# Patient Record
Sex: MALE | Race: WHITE | NOT HISPANIC OR LATINO
[De-identification: names, ages, dates, MRNs, and addresses within clinical notes are randomized per-mention and may not be internally consistent; named-entity substitution may affect disease eponyms.]

---

## 2018-10-10 ENCOUNTER — APPOINTMENT (OUTPATIENT)
Dept: FAMILY MEDICINE | Facility: CLINIC | Age: 38
End: 2018-10-10
Payer: OTHER GOVERNMENT

## 2018-10-10 VITALS
DIASTOLIC BLOOD PRESSURE: 76 MMHG | TEMPERATURE: 98.6 F | HEIGHT: 70 IN | OXYGEN SATURATION: 96 % | BODY MASS INDEX: 26.48 KG/M2 | WEIGHT: 185 LBS | SYSTOLIC BLOOD PRESSURE: 110 MMHG | HEART RATE: 75 BPM | RESPIRATION RATE: 16 BRPM

## 2018-10-10 DIAGNOSIS — G44.019 EPISODIC CLUSTER HEADACHE, NOT INTRACTABLE: ICD-10-CM

## 2018-10-10 PROCEDURE — 99213 OFFICE O/P EST LOW 20 MIN: CPT

## 2018-10-10 NOTE — PLAN
[FreeTextEntry1] : Rx as above. Handed out pt information about Cluster headache. Pt will  have previous blood work from the  sent to us.

## 2018-10-10 NOTE — HEALTH RISK ASSESSMENT
[No falls in past year] : Patient reported no falls in the past year [0] : 2) Feeling down, depressed, or hopeless: Not at all (0) [] : No [de-identified] : Social  [[id number]] : 0

## 2018-10-10 NOTE — HISTORY OF PRESENT ILLNESS
[FreeTextEntry1] : Pt c/o HA on right side head  X 1 week  [de-identified] : Pt has had hx of HA for years. Pt has had neuro workup in 2015. CT and MRI were normal. Pt states pain makes him feel "electrocuted" Right side and ear. sensitive to tough. Pt denies visual changes. Has difficulty concentrating. Pt denies photo and phono phobia. Pt denies nausea and vomiting. Pt states he gets this once a year. Lasts about a few weeks. Pt denies any triggers- mabey seasonal allergies. Last blood work was this year.

## 2018-10-10 NOTE — PHYSICAL EXAM
[No Acute Distress] : no acute distress [Well Nourished] : well nourished [Well Developed] : well developed [Well-Appearing] : well-appearing [Normal Sclera/Conjunctiva] : normal sclera/conjunctiva [PERRL] : pupils equal round and reactive to light [EOMI] : extraocular movements intact [Fundoscopic Exam Performed] : fundoscopic ~T exam ~C was performed [Normal Outer Ear/Nose] : the outer ears and nose were normal in appearance [Normal Oropharynx] : the oropharynx was normal [Normal TMs] : both tympanic membranes were normal [Normal Nasal Mucosa] : the nasal mucosa was normal [No JVD] : no jugular venous distention [Supple] : supple [No Lymphadenopathy] : no lymphadenopathy [Thyroid Normal, No Nodules] : the thyroid was normal and there were no nodules present [No Respiratory Distress] : no respiratory distress  [Clear to Auscultation] : lungs were clear to auscultation bilaterally [No Accessory Muscle Use] : no accessory muscle use [Normal Rate] : normal rate  [Regular Rhythm] : with a regular rhythm [Normal S1, S2] : normal S1 and S2 [No Murmur] : no murmur heard [Soft] : abdomen soft [Non Tender] : non-tender [Non-distended] : non-distended [Normal Posterior Cervical Nodes] : no posterior cervical lymphadenopathy [Normal Anterior Cervical Nodes] : no anterior cervical lymphadenopathy [No Joint Swelling] : no joint swelling [Grossly Normal Strength/Tone] : grossly normal strength/tone [No Rash] : no rash [Normal Gait] : normal gait [Coordination Grossly Intact] : coordination grossly intact [No Focal Deficits] : no focal deficits [Deep Tendon Reflexes (DTR)] : deep tendon reflexes were 2+ and symmetric [Normal] : the deep tendon reflexes were normal [Normal Affect] : the affect was normal [Normal Insight/Judgement] : insight and judgment were intact [de-identified] : F

## 2018-10-17 ENCOUNTER — APPOINTMENT (OUTPATIENT)
Dept: FAMILY MEDICINE | Facility: CLINIC | Age: 38
End: 2018-10-17
Payer: OTHER GOVERNMENT

## 2018-10-17 VITALS
HEIGHT: 70 IN | OXYGEN SATURATION: 96 % | DIASTOLIC BLOOD PRESSURE: 70 MMHG | WEIGHT: 207 LBS | HEART RATE: 67 BPM | SYSTOLIC BLOOD PRESSURE: 106 MMHG | TEMPERATURE: 98.1 F | RESPIRATION RATE: 16 BRPM | BODY MASS INDEX: 29.63 KG/M2

## 2018-10-17 DIAGNOSIS — R51 HEADACHE: ICD-10-CM

## 2018-10-17 DIAGNOSIS — H60.501 UNSPECIFIED ACUTE NONINFECTIVE OTITIS EXTERNA, RIGHT EAR: ICD-10-CM

## 2018-10-17 DIAGNOSIS — H65.03 ACUTE SEROUS OTITIS MEDIA, BILATERAL: ICD-10-CM

## 2018-10-17 PROCEDURE — 99214 OFFICE O/P EST MOD 30 MIN: CPT | Mod: 25

## 2018-10-17 PROCEDURE — 92567 TYMPANOMETRY: CPT | Mod: 59

## 2018-10-17 NOTE — PHYSICAL EXAM
[No Acute Distress] : no acute distress [Well Nourished] : well nourished [Well Developed] : well developed [Well-Appearing] : well-appearing [Normal Outer Ear/Nose] : the outer ears and nose were normal in appearance [Normal Oropharynx] : the oropharynx was normal [Normal Sclera/Conjunctiva] : normal sclera/conjunctiva [PERRL] : pupils equal round and reactive to light [EOMI] : extraocular movements intact [Normal Posterior Cervical Nodes] : no posterior cervical lymphadenopathy [Normal Anterior Cervical Nodes] : no anterior cervical lymphadenopathy [No Spinal Tenderness] : no spinal tenderness [Normal Gait] : normal gait [Coordination Grossly Intact] : coordination grossly intact [No Focal Deficits] : no focal deficits [Normal Affect] : the affect was normal [Normal Insight/Judgement] : insight and judgment were intact [de-identified] : Right external canla swollen and red. R tm membrane erythematous and bulging. L tm erythmateous.

## 2018-10-17 NOTE — HISTORY OF PRESENT ILLNESS
[FreeTextEntry1] : Pt c/o Right ear pain X5 days  [de-identified] : Pt states he has an infection on the outer ear on the right side for the last five days. pain is tight and ear is swollen. Pt denies any hearing loss and discharge. Pt feels otherwise in good health. Pt denies any fever, fatigue, cough, sinus congestion. Pt states his headaches seem to have improved. Pt saw neurology who seems to think the etiology of his headaches is a pinched nerve. Pt is currently taking Gabapentin for HA.

## 2018-10-17 NOTE — PLAN
[FreeTextEntry1] : Rx as above. Pt states he has seen Neuro for headaches. Pt states the Imitrex caused him to be drowsy and the Fioricet helped only a little. Pt is currently taking gabapentin for headaches which seems to help.

## 2018-10-17 NOTE — REVIEW OF SYSTEMS
[Earache] : earache [Negative] : Heme/Lymph [Hearing Loss] : no hearing loss [Postnasal Drip] : no postnasal drip [Nasal Discharge] : no nasal discharge [Sore Throat] : no sore throat

## 2018-10-17 NOTE — HEALTH RISK ASSESSMENT
[No falls in past year] : Patient reported no falls in the past year [0] : 2) Feeling down, depressed, or hopeless: Not at all (0) [] : No [de-identified] : Social  [[id number]] : 0

## 2020-02-21 ENCOUNTER — RX RENEWAL (OUTPATIENT)
Age: 40
End: 2020-02-21

## 2022-04-07 ENCOUNTER — NON-APPOINTMENT (OUTPATIENT)
Age: 42
End: 2022-04-07

## 2022-04-07 ENCOUNTER — APPOINTMENT (OUTPATIENT)
Dept: UROLOGY | Facility: CLINIC | Age: 42
End: 2022-04-07
Payer: OTHER GOVERNMENT

## 2022-04-07 VITALS
HEART RATE: 67 BPM | HEIGHT: 70 IN | DIASTOLIC BLOOD PRESSURE: 71 MMHG | SYSTOLIC BLOOD PRESSURE: 116 MMHG | WEIGHT: 220 LBS | BODY MASS INDEX: 31.5 KG/M2 | TEMPERATURE: 93.2 F | OXYGEN SATURATION: 98 %

## 2022-04-07 PROCEDURE — 99203 OFFICE O/P NEW LOW 30 MIN: CPT

## 2022-04-07 RX ORDER — AMOXICILLIN AND CLAVULANATE POTASSIUM 875; 125 MG/1; MG/1
875-125 TABLET, COATED ORAL
Qty: 20 | Refills: 0 | Status: DISCONTINUED | COMMUNITY
Start: 2018-10-17 | End: 2022-04-07

## 2022-04-07 RX ORDER — DIAZEPAM 10 MG/1
10 TABLET ORAL
Qty: 2 | Refills: 0 | Status: ACTIVE | COMMUNITY
Start: 2022-04-07 | End: 1900-01-01

## 2022-04-07 RX ORDER — CIPROFLOXACIN AND DEXAMETHASONE 3; 1 MG/ML; MG/ML
0.3-0.1 SUSPENSION/ DROPS AURICULAR (OTIC) TWICE DAILY
Qty: 1 | Refills: 0 | Status: DISCONTINUED | COMMUNITY
Start: 2018-10-17 | End: 2022-04-07

## 2022-04-07 RX ORDER — SUMATRIPTAN 50 MG/1
50 TABLET, FILM COATED ORAL
Qty: 20 | Refills: 0 | Status: DISCONTINUED | COMMUNITY
Start: 2018-10-10 | End: 2022-04-07

## 2022-04-07 RX ORDER — BUTALBITAL, ASPIRIN, AND CAFFEINE 325; 50; 40 MG/1; MG/1; MG/1
50-325-40 CAPSULE ORAL
Qty: 20 | Refills: 0 | Status: DISCONTINUED | COMMUNITY
Start: 2018-10-10 | End: 2022-04-07

## 2022-04-07 NOTE — ASSESSMENT
[FreeTextEntry1] : We discussed with the patient that vasectomy will be done in the office under the local anesthesia\par We also discussed the possible pain, hematoma and infection after procedure\par WE also inform patient that in the long term 7 % of the patients have a chance for scrotal pain\par I personally inform the patient that after vasectomy the vasectomy revers is a complicated and expensive procedure with successful rates of 75 %\par Patient wants to proceed with vasectomy

## 2022-04-07 NOTE — HISTORY OF PRESENT ILLNESS
[FreeTextEntry1] : 42 year-old patient presented today to discuss bilateral vasectomy\par He is  and has 2 children\par He and his wife got the mutual decision for bilateral vasectomy\par We discussed today the steps of procedure and its possible adverse effects

## 2022-04-08 LAB
BILIRUB UR QL STRIP: NEGATIVE
CLARITY UR: CLEAR
COLLECTION METHOD: NORMAL
GLUCOSE UR-MCNC: NEGATIVE
HCG UR QL: 0.2 EU/DL
HGB UR QL STRIP.AUTO: NORMAL
KETONES UR-MCNC: NEGATIVE
LEUKOCYTE ESTERASE UR QL STRIP: NEGATIVE
NITRITE UR QL STRIP: NEGATIVE
PH UR STRIP: 6
PROT UR STRIP-MCNC: NEGATIVE
SP GR UR STRIP: 1

## 2022-05-11 ENCOUNTER — APPOINTMENT (OUTPATIENT)
Dept: UROLOGY | Facility: CLINIC | Age: 42
End: 2022-05-11
Payer: OTHER GOVERNMENT

## 2022-05-11 VITALS
TEMPERATURE: 97.3 F | WEIGHT: 220 LBS | HEART RATE: 83 BPM | SYSTOLIC BLOOD PRESSURE: 110 MMHG | DIASTOLIC BLOOD PRESSURE: 68 MMHG | BODY MASS INDEX: 31.5 KG/M2 | HEIGHT: 70 IN

## 2022-05-11 DIAGNOSIS — Z98.52 VASECTOMY STATUS: ICD-10-CM

## 2022-05-11 PROCEDURE — 55250 REMOVAL OF SPERM DUCT(S): CPT

## 2022-05-16 ENCOUNTER — NON-APPOINTMENT (OUTPATIENT)
Age: 42
End: 2022-05-16

## 2022-05-18 ENCOUNTER — APPOINTMENT (OUTPATIENT)
Dept: UROLOGY | Facility: CLINIC | Age: 42
End: 2022-05-18
Payer: OTHER GOVERNMENT

## 2022-05-18 VITALS
SYSTOLIC BLOOD PRESSURE: 121 MMHG | HEART RATE: 81 BPM | WEIGHT: 220 LBS | BODY MASS INDEX: 31.5 KG/M2 | TEMPERATURE: 97.3 F | HEIGHT: 70 IN | DIASTOLIC BLOOD PRESSURE: 87 MMHG

## 2022-05-18 DIAGNOSIS — Z98.52 VASECTOMY STATUS: ICD-10-CM

## 2022-05-18 LAB — CORE LAB BIOPSY: NORMAL

## 2022-05-18 PROCEDURE — 99024 POSTOP FOLLOW-UP VISIT: CPT

## 2022-05-18 NOTE — HISTORY OF PRESENT ILLNESS
[FreeTextEntry1] : 43yo M s/p vasectomy last week presents for post vas checkup\par He reports the incision site is healing. He complains of mild right sided dull pain with walking and getting up from chair. He takes advil and tylenol for pain control. There is minimal bruising around right incision site.

## 2022-05-18 NOTE — PHYSICAL EXAM
[General Appearance - Well Developed] : well developed [General Appearance - Well Nourished] : well nourished [Normal Appearance] : normal appearance [Well Groomed] : well groomed [General Appearance - In No Acute Distress] : no acute distress [Abdomen Soft] : soft [Urethral Meatus] : meatus normal [Penis Abnormality] : normal uncircumcised penis [Testes Tenderness] : no tenderness of the testes [] : no respiratory distress [Oriented To Time, Place, And Person] : oriented to person, place, and time [Normal Station and Gait] : the gait and station were normal for the patient's age [No Focal Deficits] : no focal deficits [FreeTextEntry1] : small ecchymosis around right incision. tenderness of spermatic cord. Both incision sites healing well.

## 2023-01-10 ENCOUNTER — TRANSCRIPTION ENCOUNTER (OUTPATIENT)
Age: 43
End: 2023-01-10

## 2023-02-08 ENCOUNTER — APPOINTMENT (OUTPATIENT)
Dept: PSYCHIATRY | Facility: CLINIC | Age: 43
End: 2023-02-08
Payer: OTHER GOVERNMENT

## 2023-02-08 PROCEDURE — 90791 PSYCH DIAGNOSTIC EVALUATION: CPT

## 2023-02-09 NOTE — PHYSICAL EXAM
[Average] : average [Cooperative] : cooperative [Depressed] : depressed [Anxious] : anxious [Full] : full [Clear] : clear [Linear/Goal Directed] : linear/goal directed [WNL] : within normal limits [de-identified] : Mr. Rosenberg was tearful, at times, during today's intake.

## 2023-02-09 NOTE — FAMILY HISTORY
[FreeTextEntry1] : Mr. Rosenberg denied any known family history of mental health diagnoses, concerns, or treatment. In addition, Mr. Rosenberg denied any known family history of alcohol abuse or drug use. Mr. Rosenberg denied any family history of suicide attempts, completed suicide, or psychiatric hospitalizations. Further, Mr. Rosenberg denied any family history of psychosis or manic symptoms.
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
## 2023-02-09 NOTE — PLAN
[Admit to Program     (Add Program Admission information to a new column in the Admit/Discharge Flowsheet)] : Admit to program [Every ___ week(s)] : Psychotherapy: Every [unfilled] week(s) [Individual Therapy] : Individual Therapy [FreeTextEntry4] : Review with treatment team. Mr. Rosenberg will benefit from weekly individual therapy to address client's current symptoms of depression as well as client's identified trauma-related symptoms. In addition, Mr. Rosenberg will benefit from individual therapy to provide client with additional support around his upcoming transition out of the [employer].
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
## 2023-02-09 NOTE — REASON FOR VISIT
[Number can be texted] : number can be texted [OK  to leave message] : OK  to leave message [Other:___] : [unfilled] [Non-[facility] Health Provider/Facility] : Non-[facility] Health Provider/Facility [Patient] : Patient [FreeTextEntry5] : English [FreeTextEntry6] : [facility] [FreeTextEntry1] : "I don't feel right"

## 2023-02-15 ENCOUNTER — APPOINTMENT (OUTPATIENT)
Dept: PSYCHIATRY | Facility: CLINIC | Age: 43
End: 2023-02-15
Payer: OTHER GOVERNMENT

## 2023-02-15 PROCEDURE — 90837 PSYTX W PT 60 MINUTES: CPT

## 2023-02-15 NOTE — PLAN
[Cognitive and/or Behavior Therapy] : Cognitive and/or Behavior Therapy  [Psychoeducation] : Psychoeducation  [Skills training (all types)] : Skills training (all types)  [Supportive Therapy] : Supportive Therapy [Recommended Frequency of Visits: ____] : Recommended frequency of visits: [unfilled] [Return in ____ week(s)] : Return in [unfilled] week(s) [de-identified] : Mr. Rosenberg arrived on time for today's appointment and was oriented to person, place, and time. Mr. Rosenberg and this provider briefly reflected on and discussed client's reactions to his initial intake as well as his reported improved mood since his initial appointment. Mr. Rosenberg expressed concerns around his pattern of being unproductive during the days and the impact of his perception of himself as wasting time on his overall mood. Mr. Rosenberg and this provider worked to better understand client's current routine and sense of being unproductive. In addition, Mr. Rosenberg and this provide identified and discussed client's drive to be efficient and forward thinking. Mr. Rosenberg's experiences were normalized by this provider. Mr. Rosenberg was also provided with psychoeducation around rest, idle time, and self-care and the impact of these activities and behaviors on efficiency and productivity. Further, Mr. Rosenberg and this provider identified and discussed client's pattern of negative self-talk when he feels unproductive and the impact of these self-statements on his overall mood. Mr. Rosenberg and this provider also began to identify and discuss client's avoidance of idle time. Mr. Rosenberg and this provider continued to explore and discuss client's history of trauma as well as his reactions to these experiences and ongoing pattern of emotional avoidance. Mr. Rosenberg was provided with psychoeducation around trauma and trauma reactions. In addition, Mr. Rosenberg and this provider began to explore and discuss client's relationship to emotions as well as his concerns around having time to access his emotions and past experiences.  [FreeTextEntry1] : Continue weekly individual therapy to address symptoms of depression and trauma-related symptoms using a CBT-informed approach.\par \par Problem: depression, low mood, negative beliefs about self, feelings of guilt, worthlessness\par Treatment: CBT, psychoeducation, behavioral activation\par \par Problem: trauma-related symptoms, avoidance of emotions, negative beliefs about self and others, hyperarousal\par Treatment: psychoeducation, exposure therapy, CBT/CPT\par \par Problem: anxiety, rumination, worry, difficulties relaxing, irritability\par Treatment: grounding exercises, CBT, psychoeducation\par

## 2023-02-15 NOTE — PHYSICAL EXAM
[Average] : average [Cooperative] : cooperative [Euthymic] : euthymic [Anxious] : anxious [Full] : full [Clear] : clear [Linear/Goal Directed] : linear/goal directed [WNL] : within normal limits

## 2023-02-22 ENCOUNTER — APPOINTMENT (OUTPATIENT)
Dept: PSYCHIATRY | Facility: CLINIC | Age: 43
End: 2023-02-22
Payer: OTHER GOVERNMENT

## 2023-02-22 PROCEDURE — 90837 PSYTX W PT 60 MINUTES: CPT

## 2023-02-22 NOTE — RISK ASSESSMENT
----- Message from Yevgeniy Lafleur MD sent at 7/10/2019  5:18 PM EDT -----  a1c is good at 6.0 ( 6.7 previously). Recheck a1c in 6 months. Psa is lower 4.7 to 4.3. Recheck psa in 6 months also. [No, patient denies ideation or behavior] : No, patient denies ideation or behavior [Low acute suicide risk] : Low acute suicide risk [No] : No [Not clinically indicated] : Safety Plan completed/updated (for individuals at risk): Not clinically indicated

## 2023-02-22 NOTE — PLAN
[Cognitive and/or Behavior Therapy] : Cognitive and/or Behavior Therapy  [Psychoeducation] : Psychoeducation  [Skills training (all types)] : Skills training (all types)  [Supportive Therapy] : Supportive Therapy [Recommended Frequency of Visits: ____] : Recommended frequency of visits: [unfilled] [Return in ____ week(s)] : Return in [unfilled] week(s) [de-identified] : Mr. Rosenberg arrived on time for today's appointment and was oriented to person, place, and time. Mr. Rosenberg described having an "interesting" week since his last appointment with this provider. Specifically, Mr. 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 Mr. Rosenberg and this provider further explored and discussed client's reactions to these events and client's experiences were normalized and validated by this provider. In addition, Mr. Rosenberg and this provider continued to reflect on and discuss client's relationship to emotions and the pressure he places on himself to control his emotions. Mr. Rosenberg shared and discussed his past experience of disconnecting emotions from specific events, particularly those client considers to be traumatic. Mr. Rosenberg was provided with psychoeducation around the fight-or-flight response, trauma reactions, and emotional avoidance. Mr. Rosenberg and this provider also began to explore and discuss the ways in which client's upbringing and  service reinforced client's patterns of emotional avoidance. Further, Mr. Rosenberg and this provider began to identify and reflect on client's fear of connecting to his emotions as client worries he will lose situational awareness and/or control.  [FreeTextEntry1] : Continue weekly individual therapy to address symptoms of depression and trauma-related symptoms using a CBT-informed approach.\par \par Problem: depression, low mood, negative beliefs about self, feelings of guilt, worthlessness\par Treatment: CBT, psychoeducation, behavioral activation\par \par Problem: trauma-related symptoms, avoidance of emotions, negative beliefs about self and others, hyperarousal\par Treatment: psychoeducation, exposure therapy, CBT/CPT\par \par Problem: anxiety, rumination, worry, difficulties relaxing, irritability\par Treatment: grounding exercises, CBT, psychoeducation\par

## 2023-02-28 ENCOUNTER — APPOINTMENT (OUTPATIENT)
Dept: PSYCHIATRY | Facility: CLINIC | Age: 43
End: 2023-02-28

## 2023-02-28 ENCOUNTER — NON-APPOINTMENT (OUTPATIENT)
Age: 43
End: 2023-02-28

## 2023-03-09 ENCOUNTER — APPOINTMENT (OUTPATIENT)
Dept: PSYCHIATRY | Facility: CLINIC | Age: 43
End: 2023-03-09
Payer: OTHER GOVERNMENT

## 2023-03-09 PROCEDURE — 90837 PSYTX W PT 60 MINUTES: CPT | Mod: 95

## 2023-03-09 NOTE — REASON FOR VISIT
[Patient preference] : as per patient preference [Starting, patient seen in-person within last 6 months] : Telehealth services are being started as patient has seen in-person within last 6 months. [Telehealth (audio & video) - Individual/Group] : This visit was provided via telehealth using real-time 2-way audio visual technology. [Medical Office: ([facility])___] : The provider was located at the medical office in [unfilled]. [Home] : The patient, [unfilled], was located at home, [unfilled], at the time of the visit. [Verbal consent obtained from patient/other participant(s)] : Verbal consent for telehealth/telephonic services obtained from patient/other participant(s) [FreeTextEntry4] : 11:30AM [FreeTextEntry5] : 12:30PM [FreeTextEntry2] : 2/22/23 [Patient] : Patient

## 2023-03-09 NOTE — END OF VISIT
[Teletherapy Service Provided] : The services provided in this session were delivered via tele-therapy [Duration of Psychotherapy Visit (minutes spent in synchronous communication): ____] : Duration of Psychotherapy Visit (minutes spent in synchronous communication): [unfilled] [Individual Psychotherapy for 53+ minutes] : Individual Psychotherapy for 53+ minutes

## 2023-03-09 NOTE — PLAN
[Cognitive and/or Behavior Therapy] : Cognitive and/or Behavior Therapy  [Psychoeducation] : Psychoeducation  [Skills training (all types)] : Skills training (all types)  [Supportive Therapy] : Supportive Therapy [Recommended Frequency of Visits: ____] : Recommended frequency of visits: [unfilled] [Return in ____ week(s)] : Return in [unfilled] week(s) [de-identified] : Mr. Rosenberg arrived on time for today's appointment and was oriented to person, place, and time. Mr. Rosenberg shared and discussed his first week of work and the administrative difficulties he has encountered transitioning into his position. Mr. Rosenberg reported frustration and anxiety related to these difficulties and noted that as a result of these difficulties he feels he has tried harder to counter any potential negative perceptions of coworkers. Mr. Rosenberg and this provider worked to further understand client's concerns around his transition into his position and challenged his beliefs that others will view him negatively because of administrative difficulties. Mr. Rosenberg also shared and discussed his discomfort with the seating arrangement on days when he is in the office as a result of client's perceived blind spots from his desk. Mr. Rosenberg's difficulties were normalized by this provider. Mr. Rosenberg and this provider also reviewed and discussed client's history of positioning himself in corners to reduce his overall anxiety and discomfort. In addition to his transition to work, Mr. Rosenberg shared and discussed his surprise and confusion around feelings of sadness that he experienced commuting to the office. Mr. Rosenberg and this provider further explored and discussed the circumstances surrounding his feelings of sadness and client expressed his beliefs that while he is happy for his current job, he does not feel he deserves to be in his current circumstances. Mr. Rosenberg and this provider continued to reflect on client's sadness as well as his report of the recent anniversary of client witnessing the loss of several peers in [facility]. Mr. Rosenberg's experiences and reactions were normalized by this provider. Mr. Rosenberg was also provided with psychoeducation on trauma and trauma reactions. In addition, Mr. Rosenberg and this provider continued to explore and discuss client's pattern of emotional avoidance and the impact of client's avoidance on his current functioning.  [FreeTextEntry1] : Continue weekly individual therapy to address symptoms of depression and trauma-related symptoms using a CBT-informed approach.\par \par Problem: depression, low mood, negative beliefs about self, feelings of guilt, worthlessness\par Treatment: CBT, psychoeducation, behavioral activation\par \par Problem: trauma-related symptoms, avoidance of emotions, negative beliefs about self and others, hyperarousal\par Treatment: psychoeducation, exposure therapy, CBT/CPT\par \par Problem: anxiety, rumination, worry, difficulties relaxing, irritability\par Treatment: grounding exercises, CBT, psychoeducation\par

## 2023-03-16 ENCOUNTER — APPOINTMENT (OUTPATIENT)
Dept: PSYCHIATRY | Facility: CLINIC | Age: 43
End: 2023-03-16
Payer: OTHER GOVERNMENT

## 2023-03-16 PROCEDURE — 90837 PSYTX W PT 60 MINUTES: CPT | Mod: 95

## 2023-03-16 NOTE — PLAN
[Cognitive and/or Behavior Therapy] : Cognitive and/or Behavior Therapy  [Skills training (all types)] : Skills training (all types)  [Psychoeducation] : Psychoeducation  [Supportive Therapy] : Supportive Therapy [Recommended Frequency of Visits: ____] : Recommended frequency of visits: [unfilled] [Return in ____ week(s)] : Return in [unfilled] week(s) [de-identified] : Mr. Rosenberg arrived on time for today's appointment and was oriented to person, place, and time. Mr. Rosenberg shared and discussed his continued frustration and anxiety related to his onboarding/orientation difficulties. In addition, Mr. Rosenberg shared and reflected on his experience of imposter syndrome and his worry about not meeting the expectations of others. Mr. Rosenberg's reactions were normalized by this provider. Mr. Rosenberg and this provider also identified and reflected on client's expectations for himself in the workplace and the impact of client's expectations on his worry and fear. In addition, Mr. Rosenberg and this provider identified and discussed client's beliefs that he is failing at various tasks despite these tasks being out of client's control. Mr. Rosenberg and this provider further explored and discussed client's beliefs about failure, the impact of these beliefs on his confidence, anxiety, and worry, and worked to challenge client's beliefs related to failure. Mr. Rosenberg and this provider worked to identify and discuss client's strengths and areas of confidence in the workplace. Mr. Rosenberg shared and discussed his perception of himself as being inadequate and having few to no strengths. Mr. Rosenberg's difficulties were normalized by this provider. Mr. Rosenberg and this provider began to explore and discuss client's difficulties internalizing his strengths and his use of focusing on his deficits for motivation. Mr. Rosenberg was challenged to begin to identify positive attributes and strengths.  [FreeTextEntry1] : Continue weekly individual therapy to address symptoms of depression and trauma-related symptoms using a CBT-informed approach.\par \par Problem: depression, low mood, negative beliefs about self, feelings of guilt, worthlessness\par Treatment: CBT, psychoeducation, behavioral activation\par \par Problem: trauma-related symptoms, avoidance of emotions, negative beliefs about self and others, hyperarousal\par Treatment: psychoeducation, exposure therapy, CBT/CPT\par \par Problem: anxiety, rumination, worry, difficulties relaxing, irritability\par Treatment: grounding exercises, CBT, psychoeducation\par

## 2023-03-16 NOTE — REASON FOR VISIT
[Patient preference] : as per patient preference [Starting, patient seen in-person within last 6 months] : Telehealth services are being started as patient has seen in-person within last 6 months. [Telehealth (audio & video) - Individual/Group] : This visit was provided via telehealth using real-time 2-way audio visual technology. [Medical Office: ([facility])___] : The provider was located at the medical office in [unfilled]. [Home] : The patient, [unfilled], was located at home, [unfilled], at the time of the visit. [Verbal consent obtained from patient/other participant(s)] : Verbal consent for telehealth/telephonic services obtained from patient/other participant(s) [Patient] : Patient [FreeTextEntry4] : 12:00PM [FreeTextEntry5] : 1:00PM [FreeTextEntry2] : 2/22/23

## 2023-03-23 ENCOUNTER — APPOINTMENT (OUTPATIENT)
Dept: PSYCHIATRY | Facility: CLINIC | Age: 43
End: 2023-03-23
Payer: OTHER GOVERNMENT

## 2023-03-23 PROCEDURE — 90837 PSYTX W PT 60 MINUTES: CPT | Mod: 95

## 2023-03-23 NOTE — PLAN
[Cognitive and/or Behavior Therapy] : Cognitive and/or Behavior Therapy  [Psychoeducation] : Psychoeducation  [Skills training (all types)] : Skills training (all types)  [Supportive Therapy] : Supportive Therapy [Recommended Frequency of Visits: ____] : Recommended frequency of visits: [unfilled] [Return in ____ week(s)] : Return in [unfilled] week(s) [de-identified] : Mr. Rosenberg arrived on time for today's appointment and was oriented to person, place, and time. Mr. 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 Mr. Rosenberg and this provider further explored and discussed client's current stressors and strategies client has been using to cope with these stressors. Mr. Rosenberg shared and discussed his focus on planning next steps for his wife's care as well as his drive to problem-solve his other stressors. Mr. Rosenberg and this provider continued to explore and discuss client's pattern of using planning and problem-solving to both cope with and avoid his emotions. Mr. Rosenberg and this provider focused on client's reaction to his wife's hospitalization and the impact of her health on his overall mood and functioning. In addition, Mr. Rosenberg and this provider continued to reflect on and discuss client's -related traumas and his use of scheduling and planning to avoid reminders and emotions related to these experiences. Mr. Rosenberg was provided with psychoeducation around trauma and trauma-related symptoms. Mr. Rosenberg and this provider also identified and discussed the pros and cons of client's current emotional avoidance. Mr. Rosenberg identified feelings of anger related to several past traumas and client's anger was normalized by this provider. Mr. Rosenberg and this provider also further explored client's anger and connected his anger to the blame he places on others for these events happening. Mr. Rosenberg was provided with psychoeducation around natural vs. manufactured emotions and client and this provider began to reflect on client's experiences of these types of emotions.  [FreeTextEntry1] : Continue weekly individual therapy to address symptoms of depression and trauma-related symptoms using a CBT-informed approach.\par \par Problem: depression, low mood, negative beliefs about self, feelings of guilt, worthlessness\par Treatment: CBT, psychoeducation, behavioral activation\par \par Problem: trauma-related symptoms, avoidance of emotions, negative beliefs about self and others, hyperarousal\par Treatment: psychoeducation, exposure therapy, CBT/CPT\par \par Problem: anxiety, rumination, worry, difficulties relaxing, irritability\par Treatment: grounding exercises, CBT, \par

## 2023-03-23 NOTE — END OF VISIT
[Duration of Psychotherapy Visit (minutes spent in synchronous communication): ____] : Duration of Psychotherapy Visit (minutes spent in synchronous communication): [unfilled] [Teletherapy Service Provided] : The services provided in this session were delivered via tele-therapy [Individual Psychotherapy for 53+ minutes] : Individual Psychotherapy for 53+ minutes

## 2023-03-23 NOTE — REASON FOR VISIT
[Patient preference] : as per patient preference [Starting, patient seen in-person within last 6 months] : Telehealth services are being started as patient has seen in-person within last 6 months. [Telehealth (audio & video) - Individual/Group] : This visit was provided via telehealth using real-time 2-way audio visual technology. [Medical Office: ([facility])___] : The provider was located at the medical office in [unfilled]. [Home] : The patient, [unfilled], was located at home, [unfilled], at the time of the visit. [Verbal consent obtained from patient/other participant(s)] : Verbal consent for telehealth/telephonic services obtained from patient/other participant(s) [Patient] : Patient [FreeTextEntry4] : 12:00PM [FreeTextEntry5] : 1:00PM

## 2023-03-30 ENCOUNTER — APPOINTMENT (OUTPATIENT)
Dept: PSYCHIATRY | Facility: CLINIC | Age: 43
End: 2023-03-30
Payer: OTHER GOVERNMENT

## 2023-03-30 PROCEDURE — 90837 PSYTX W PT 60 MINUTES: CPT | Mod: 95

## 2023-03-30 NOTE — PLAN
[Cognitive and/or Behavior Therapy] : Cognitive and/or Behavior Therapy  [Psychoeducation] : Psychoeducation  [Skills training (all types)] : Skills training (all types)  [Supportive Therapy] : Supportive Therapy [Recommended Frequency of Visits: ____] : Recommended frequency of visits: [unfilled] [Return in ____ week(s)] : Return in [unfilled] week(s) [de-identified] : Mr. Rosenberg arrived on time for today's appointment and was oriented to person, place, and time. Mr. Rosenberg briefly shared and discussed his wife's transition home following 8 days in the hospital and client's recognition of the similarities between her return and client's own return from [facility] in the past. In addition, Mr. Rosenberg shared and reflected on his reactions to learning that his best friend's father  as well as client's efforts to support his friend. Mr. Rosenberg's experiences and reactions were validated and normalized by this provider. Mr. Rosenberg focused today's session on client's reaction to learning of a 's death by suicide and his feelings of fear and sadness related to this news. Mr. Rosenberg and this provider further explored and discussed client's reaction to this news and client's worry that he will, at some point, experience similar difficulties. Mr. Rosenberg identified his own history of trauma and his patterns of isolation and emotional avoidance as contributing to client's worry about his well-being in the future. Mr. Rosenberg's concerns were normalized by this provider. Mr. Rosenberg and this provider continued to explore and discuss the impact of client's history on his current functioning and concerns. In addition, Mr. Rosenberg and this provider continued to identify and discuss the differences between emotional avoidance and emotional processing. Mr. Rosenberg agreed to begin tracking specific memories/events/triggers client experiences throughout the week.  [FreeTextEntry1] : Continue weekly individual therapy to address symptoms of depression and trauma-related symptoms using a CBT-informed approach.\par \par Problem: depression, low mood, negative beliefs about self, feelings of guilt, worthlessness\par Treatment: CBT, psychoeducation, behavioral activation\par \par Problem: trauma-related symptoms, avoidance of emotions, negative beliefs about self and others, hyperarousal\par Treatment: psychoeducation, exposure therapy, CBT/CPT\par \par Problem: anxiety, rumination, worry, difficulties relaxing, irritability\par Treatment: grounding exercises, CBT, psychoeducation\par

## 2023-03-30 NOTE — REASON FOR VISIT
[Patient preference] : as per patient preference [Starting, patient seen in-person within last 6 months] : Telehealth services are being started as patient has seen in-person within last 6 months. [Telehealth (audio & video) - Individual/Group] : This visit was provided via telehealth using real-time 2-way audio visual technology. [Other Location: e.g. Home (Enter Location, City,State)___] : The provider was located at [unfilled]. [Home] : The patient, [unfilled], was located at home, [unfilled], at the time of the visit. [Verbal consent obtained from patient/other participant(s)] : Verbal consent for telehealth/telephonic services obtained from patient/other participant(s) [Patient] : Patient [FreeTextEntry4] : 12:00PM [FreeTextEntry5] : 1:00PM

## 2023-04-06 ENCOUNTER — APPOINTMENT (OUTPATIENT)
Dept: PSYCHIATRY | Facility: CLINIC | Age: 43
End: 2023-04-06
Payer: OTHER GOVERNMENT

## 2023-04-06 PROCEDURE — 90837 PSYTX W PT 60 MINUTES: CPT | Mod: 95

## 2023-04-06 NOTE — REASON FOR VISIT
[Patient preference] : as per patient preference [Starting, patient seen in-person within last 6 months] : Telehealth services are being started as patient has seen in-person within last 6 months. [Telehealth (audio & video) - Individual/Group] : This visit was provided via telehealth using real-time 2-way audio visual technology. [Medical Office: ([facility])___] : The provider was located at the medical office in [unfilled]. [Home] : The patient, [unfilled], was located at home, [unfilled], at the time of the visit. [Verbal consent obtained from patient/other participant(s)] : Verbal consent for telehealth/telephonic services obtained from patient/other participant(s) [Patient] : Patient [FreeTextEntry4] : 12:00PM [FreeTextEntry5] : 1:02PM

## 2023-04-06 NOTE — PHYSICAL EXAM
[Average] : average [Cooperative] : cooperative [Full] : full [Clear] : clear [Linear/Goal Directed] : linear/goal directed [WNL] : within normal limits [FreeTextEntry8] : "stressed"

## 2023-04-06 NOTE — PLAN
[Cognitive and/or Behavior Therapy] : Cognitive and/or Behavior Therapy  [Psychoeducation] : Psychoeducation  [Skills training (all types)] : Skills training (all types)  [Supportive Therapy] : Supportive Therapy [Recommended Frequency of Visits: ____] : Recommended frequency of visits: [unfilled] [Return in ____ week(s)] : Return in [unfilled] week(s) [de-identified] : Mr. Rosenberg arrived on time for today's appointment and was oriented to person, place, and time. Mr. Rosenberg shared and reflected on an experience with his family over the weekend during which client became frustrated and banged his steering wheel. Mr. Rosenberg and this provider further explored and discussed client's frustration and the circumstances surrounding this experience. In addition, Mr. Rosenberg shared and discussed feelings of frustration and agitation at work within the context of other's not taking initiative. Mr. Rosenberg's frustrations were normalized by this provider. Mr. Rosenberg and this provider further explored and discussed client's report of frustration in both circumstances and also identified client's feelings of helplessness and inadequacy. Further, Mr. Rosenberg and this provider continued to reflect on and discuss client's discomfort with and disbelief when receiving praise and the ways in which client's beliefs about himself might also impact client's emotions at work and at home. Mr. Rosenberg and this provider also began to identify and reflect on client's drive to problem-solve situations and emotions and his experience of frustration when he is unable to do so. Mr. Rosenberg and this provider continued to challenge client's perception of himself as having to solve problems and emotions for himself and others.  [FreeTextEntry1] : Continue weekly individual therapy to address symptoms of depression and trauma-related symptoms using a CBT-informed approach.\par \par Problem: depression, low mood, negative beliefs about self, feelings of guilt, worthlessness\par Treatment: CBT, psychoeducation, behavioral activation\par \par Problem: trauma-related symptoms, avoidance of emotions, negative beliefs about self and others, hyperarousal\par Treatment: psychoeducation, exposure therapy, CBT/CPT\par \par Problem: anxiety, rumination, worry, difficulties relaxing, irritability\par Treatment: grounding exercises, CBT, psychoeducation\par

## 2023-04-13 ENCOUNTER — APPOINTMENT (OUTPATIENT)
Dept: PSYCHIATRY | Facility: CLINIC | Age: 43
End: 2023-04-13
Payer: OTHER GOVERNMENT

## 2023-04-13 PROCEDURE — 90837 PSYTX W PT 60 MINUTES: CPT | Mod: 95

## 2023-04-13 NOTE — PLAN
[Cognitive and/or Behavior Therapy] : Cognitive and/or Behavior Therapy  [Psychoeducation] : Psychoeducation  [Skills training (all types)] : Skills training (all types)  [Supportive Therapy] : Supportive Therapy [Recommended Frequency of Visits: ____] : Recommended frequency of visits: [unfilled] [Return in ____ week(s)] : Return in [unfilled] week(s) [de-identified] : Mr. Rosenberg arrived on time for today's appointment and was oriented to person, place, and time. Mr. Rosenberg shared and discussed a concerning experience at work during which client became frustrated and upset and began writing "calm down" on a piece of paper to manage his emotional intensity. Mr. Rosenberg and this provider identified and discussed the circumstances surrounding client's frustration and client's reactions were normalized by this provider. In addition, Mr. Rosenberg and this provider further explored and discussed client's concerns regarding his use of coping skills and his negative associations to writing "calm down" on paper as client perceives this behavior to be abnormal. Mr. Rosenberg's use of effective coping skills was validated by this provider. In addition, Mr. Rosenberg was provided with psychoeducation around coping skills and individual differences in the use of coping skills. Mr. Rosenberg and this provider continued to reflect on and discuss client's beliefs related to his identified coping strategy and worked to identify alternative coping skills that have been effective and/or which client beliefs might be effective in the future. Mr. Rosenberg also expressed concerns around increased irritability at home. Mr. Rosenberg shared and discussed a specific example of an interaction with his wife. This provider reflected back to client his pattern of having his needs left unmet both at home and at work. In addition, Mr. Rosenberg and this provider briefly reflected on and discussed the impact of client's current stressors and his unmet needs on his irritability. At the end of today's appointment, Mr. Rosenberg briefly shared and discussed his reaction to reading his session notes by this provider. Mr. Rosenberg and this provider agreed to follow-up regarding client's reaction to his session notes during client's next appointment.  [FreeTextEntry1] : Continue weekly individual therapy to address symptoms of depression and trauma-related symptoms using a CBT-informed approach.\par \par Problem: depression, low mood, negative beliefs about self, feelings of guilt, worthlessness\par Treatment: CBT, psychoeducation, behavioral activation\par \par Problem: trauma-related symptoms, avoidance of emotions, negative beliefs about self and others, hyperarousal\par Treatment: psychoeducation, exposure therapy, CBT/CPT\par \par Problem: anxiety, rumination, worry, difficulties relaxing, irritability\par Treatment: grounding exercises, CBT, psychoeducation\par

## 2023-04-20 ENCOUNTER — APPOINTMENT (OUTPATIENT)
Dept: PSYCHIATRY | Facility: CLINIC | Age: 43
End: 2023-04-20
Payer: OTHER GOVERNMENT

## 2023-04-20 PROCEDURE — 90837 PSYTX W PT 60 MINUTES: CPT | Mod: 95

## 2023-04-20 NOTE — PLAN
[Cognitive and/or Behavior Therapy] : Cognitive and/or Behavior Therapy  [Psychoeducation] : Psychoeducation  [Supportive Therapy] : Supportive Therapy [Recommended Frequency of Visits: ____] : Recommended frequency of visits: [unfilled] [Return in ____ week(s)] : Return in [unfilled] week(s) [de-identified] : Mr. Rosenberg arrived on time for today's appointment and was oriented to person, place, and time. Mr. Rosenberg and this provider continued to discuss client's reaction to reading progress notes from his appointments with this provider and his reported feelings of sadness. Mr. Rosenberg identified and discussed the ways in which reading his notes have reinforced the reality of his current difficulties. In addition, Mr. Rosenberg expressed his worries about the limits of treating his current symptoms. Mr. Rosenberg's reactions and concerns were normalized by this provider. In addition, Mr. Rosenberg and this provider reviewed and discussed his beliefs around healing from physical injuries as well as his perceptions of healing from mental health difficulties and the similarities and differences between these treatments. Mr. Rosenberg and this provider further explored and discussed client's emotions while reading his notes and client identified shame, embarrassment, fear, and disappointment, in addition to sadness. Mr. Rosenberg also expressed his perception of himself as being abnormal for experiencing symptoms of depression and trauma-related symptoms. Mr. Rosenberg's reactions were normalized by this provider. Mr. Rosenberg was also provided with psychoeducation around the prevalence of depression as well as psychoeducation around reactions to trauma. Finally, Mr. Rosenberg and this provider continued to review and discuss client's concerns around interactions at work and ongoing frustrations with his director's management style. Mr. Rosenberg's frustrations were validated by this provider. Mr. Rosenberg and this provider also began to identify and discuss client's concern/anxiety around ways he is perceived and his cognitions around speech, body language, and decision making during interpersonal interactions.  [FreeTextEntry1] : Continue weekly individual therapy to address symptoms of depression and trauma-related symptoms using a CBT-informed approach.\par \par Problem: depression, low mood, negative beliefs about self, feelings of guilt, worthlessness\par Treatment: CBT, psychoeducation, behavioral activation\par \par Problem: trauma-related symptoms, avoidance of emotions, negative beliefs about self and others, hyperarousal\par Treatment: psychoeducation, exposure therapy, CBT/CPT\par \par Problem: anxiety, rumination, worry, difficulties relaxing, irritability\par Treatment: grounding exercises, CBT, psychoeducation\par

## 2023-04-20 NOTE — REASON FOR VISIT
[Patient preference] : as per patient preference [Starting, patient seen in-person within last 6 months] : Telehealth services are being started as patient has seen in-person within last 6 months. [Telehealth (audio & video) - Individual/Group] : This visit was provided via telehealth using real-time 2-way audio visual technology. [Medical Office: ([facility])___] : The provider was located at the medical office in [unfilled]. [Home] : The patient, [unfilled], was located at home, [unfilled], at the time of the visit. [Verbal consent obtained from patient/other participant(s)] : Verbal consent for telehealth/telephonic services obtained from patient/other participant(s) [Patient] : Patient [FreeTextEntry4] : 12:01PM [FreeTextEntry5] : 1:02PM

## 2023-04-27 ENCOUNTER — APPOINTMENT (OUTPATIENT)
Dept: PSYCHIATRY | Facility: CLINIC | Age: 43
End: 2023-04-27
Payer: OTHER GOVERNMENT

## 2023-04-27 PROCEDURE — 90837 PSYTX W PT 60 MINUTES: CPT | Mod: 95

## 2023-04-27 NOTE — PLAN
[Cognitive and/or Behavior Therapy] : Cognitive and/or Behavior Therapy  [Psychoeducation] : Psychoeducation  [Skills training (all types)] : Skills training (all types)  [Supportive Therapy] : Supportive Therapy [Recommended Frequency of Visits: ____] : Recommended frequency of visits: [unfilled] [de-identified] : Mr. Rosenberg arrived 7 minutes late for today's appointment and was oriented to person, place, and time. Mr. Rosenberg shared and reflected on his reaction to a conversation this week during which client was asked to identify two words to describe himself. Mr. Rosenberg expressed his perception of himself as being a jerk after initially identifying himself as being conscientious and modest. Mr. Rosenberg's traits were validated by this provider. Mr. Rosenberg and this provider further explored and discussed client's perception of himself as being a jerk for labeling himself as modest after the subsequent conversation he had with his mentor. Mr. Rosenberg and this provider continued to reflect on and discuss client's perceptions of himself and his worries about how others might perceive him. In addition, Mr. Rosenberg was challenged to identify and reflect on the ways in which client's behaviors and motivations reflect modesty. Mr. Rosenberg also shared and reflected on his reaction to a friend describing client as being a hero and his discomfort with this label and with receiving compliments. Mr. Rosenberg and this provider continued to reflect on client's reaction to positive feedback and acknowledgements from others as well as his pattern of qualifying/discounting recognition from others. Mr. Rosenberg was challenged to accept compliments from others without explicitly or implicitly discounting the feedback.  [Return in ____ week(s)] : Return in [unfilled] week(s) [FreeTextEntry1] : Continue weekly individual therapy to address symptoms of depression and trauma-related symptoms using a CBT-informed approach. Next appointment scheduled for 5/11/23 at 12PM due to scheduled provider absence on 5/4/23. \par \par Problem: depression, low mood, negative beliefs about self, feelings of guilt, worthlessness\par Treatment: CBT, psychoeducation, behavioral activation\par \par Problem: trauma-related symptoms, avoidance of emotions, negative beliefs about self and others, hyperarousal\par Treatment: psychoeducation, exposure therapy, CBT/CPT\par \par Problem: anxiety, rumination, worry, difficulties relaxing, irritability\par Treatment: grounding exercises, CBT, psychoeducation\par

## 2023-04-27 NOTE — REASON FOR VISIT
[Patient preference] : as per patient preference [Starting, patient seen in-person within last 6 months] : Telehealth services are being started as patient has seen in-person within last 6 months. [Telehealth (audio & video) - Individual/Group] : This visit was provided via telehealth using real-time 2-way audio visual technology. [Medical Office: ([facility])___] : The provider was located at the medical office in [unfilled]. [Home] : The patient, [unfilled], was located at home, [unfilled], at the time of the visit. [Verbal consent obtained from patient/other participant(s)] : Verbal consent for telehealth/telephonic services obtained from patient/other participant(s) [Patient] : Patient [FreeTextEntry4] : 12:07PM [FreeTextEntry5] : 1:01PM

## 2023-05-11 ENCOUNTER — APPOINTMENT (OUTPATIENT)
Dept: PSYCHIATRY | Facility: CLINIC | Age: 43
End: 2023-05-11
Payer: OTHER GOVERNMENT

## 2023-05-11 PROCEDURE — 90837 PSYTX W PT 60 MINUTES: CPT | Mod: 95

## 2023-05-11 NOTE — PLAN
[Cognitive and/or Behavior Therapy] : Cognitive and/or Behavior Therapy  [Psychoeducation] : Psychoeducation  [Skills training (all types)] : Skills training (all types)  [Supportive Therapy] : Supportive Therapy [Recommended Frequency of Visits: ____] : Recommended frequency of visits: [unfilled] [Return in ____ week(s)] : Return in [unfilled] week(s) [de-identified] : Mr. Rosenberg arrived on time for today's appointment and was oriented to person, place, and time. Mr. Rosenberg and this provider briefly reviewed and discussed client's upcoming schedule change and identified alternative appointment times. Mr. Rosenberg shared and discussed two recent experiences including a follow-up conversation with his mentor to discuss client's reaction to identifying himself as modest. In addition, Mr. Rosenberg reflected on and discussed his reaction to a presentation on imposter syndrome and his recognition of his own behaviors during this presentation. Mr. Rosenberg and this provider focused on client's reaction to a presentation on imposter syndrome. In addition, Mr. Rosenberg and this provider explored and discussed client's experience of tearfulness and worry during this presentation because of client being at work and connecting to the material presented. Mr. Rosenberg's reactions were normalized by this provider. Mr. Rosenberg and this provider further explored and discussed client's connection to the material presented as well as client's recognition of himself as valuing others and their feelings/opinions over himself and his own experiences. Mr. Rosenberg and this provider also reviewed and discussed client's report of becoming angry with himself and engaging in critical self-talk while emoting at work. Mr. Rosenberg's discomfort expressing emotion at work was normalized by this provider. In addition, he and this provider worked to identify alternative coping skills and self-soothing strategies instead of self-criticism. Finally, Mr. Rosenberg and this provider briefly identified and discussed client's intense discomfort with his upcoming [employer] party in early June. Mr. Rosenberg and this provider agreed to follow-up regarding client's reaction to this event during his next appointment.  [FreeTextEntry1] : Continue weekly individual therapy to address symptoms of depression and trauma-related symptoms using a CBT-informed approach. \par \par Problem: depression, low mood, negative beliefs about self, feelings of guilt, worthlessness\par Treatment: CBT, psychoeducation, behavioral activation\par \par Problem: trauma-related symptoms, avoidance of emotions, negative beliefs about self and others, hyperarousal\par Treatment: psychoeducation, exposure therapy, CBT/CPT\par \par Problem: anxiety, rumination, worry, difficulties relaxing, irritability\par Treatment: grounding exercises, CBT, psychoeducation\par

## 2023-05-11 NOTE — REASON FOR VISIT
[Patient preference] : as per patient preference [Continuing, patient seen in-person within last 12 months] : Telehealth services are continuing as patient has been seen in-person within last 12 months. [Telehealth (audio & video) - Individual/Group] : This visit was provided via telehealth using real-time 2-way audio visual technology. [Medical Office: ([facility])___] : The provider was located at the medical office in [unfilled]. [Home] : The patient, [unfilled], was located at home, [unfilled], at the time of the visit. [Verbal consent obtained from patient/other participant(s)] : Verbal consent for telehealth/telephonic services obtained from patient/other participant(s) [Patient] : Patient [FreeTextEntry4] : 11:59AM [FreeTextEntry5] : 1:01PM

## 2023-05-18 ENCOUNTER — APPOINTMENT (OUTPATIENT)
Dept: PSYCHIATRY | Facility: CLINIC | Age: 43
End: 2023-05-18
Payer: OTHER GOVERNMENT

## 2023-05-18 PROCEDURE — 90837 PSYTX W PT 60 MINUTES: CPT | Mod: 95

## 2023-05-18 NOTE — REASON FOR VISIT
[Patient preference] : as per patient preference [Continuing, patient seen in-person within last 12 months] : Telehealth services are continuing as patient has been seen in-person within last 12 months. [Telehealth (audio & video) - Individual/Group] : This visit was provided via telehealth using real-time 2-way audio visual technology. [Medical Office: ([facility])___] : The provider was located at the medical office in [unfilled]. [Home] : The patient, [unfilled], was located at home, [unfilled], at the time of the visit. [Verbal consent obtained from patient/other participant(s)] : Verbal consent for telehealth/telephonic services obtained from patient/other participant(s) [Patient] : Patient [FreeTextEntry4] : 12:00PM [FreeTextEntry5] : 1:01PM

## 2023-05-18 NOTE — PLAN
[Cognitive and/or Behavior Therapy] : Cognitive and/or Behavior Therapy  [Psychoeducation] : Psychoeducation  [Skills training (all types)] : Skills training (all types)  [Supportive Therapy] : Supportive Therapy [Recommended Frequency of Visits: ____] : Recommended frequency of visits: [unfilled] [Return in ____ week(s)] : Return in [unfilled] week(s) [de-identified] : Mr. Rosenberg arrived on time for today's appointment and was oriented to person, place, and time. Mr. Rosenberg and this provider completed the CHRIS-7 and PHQ-9. Mr. Rosenberg shared and discussed his recent experience in a work meeting in which client observed a peer drawing on a note pad and client's reaction to this behavior. In addition, Mr. Rosenberg shared and reflected on his discomfort and worry after his director disclosed to others in a meeting that client had participated in a particular discussion. Mr. Rosenberg and this provider further explored and discussed client's reactions to events in this meeting as well as his continued concerns about his relationships with his peers. In addition, Mr. Rosenberg and this provider further reflected on and discussed client's relationship with his director and the pros and cons of client establishing boundaries with his director. Further, Mr. Rosenberg and this provider briefly reflected on and discussed the benefits of client building relationships with his peer group outside of the work setting. Mr. Rosenberg and this provider also began to reflect on and discuss client's upcoming skilled nursing party and his discomfort with and distress related to this event. 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 In addition, Mr. Rosenberg expressed concerns around choosing an individual to speak at the event and ensuring that others are satisfied with the event. Mr. Rosenberg was challenged to identify his goals for this event and needs during the event given his disinterest in the event and his desire to leave his  career in the past. In addition, Mr. Rosenberg began to reflect on and discuss strategies client might use to advocate for his needs and cope with different reactions during the event. Finally, Mr. Rosenberg was challenged to consider his motivations for leaving his  career in the past. Mr. Rosenberg and this provider continued to identify and reflect on client's pattern of avoidance.  [FreeTextEntry1] : Continue weekly individual therapy to address symptoms of depression and trauma-related symptoms using a CBT-informed approach. \par \par Problem: depression, low mood, negative beliefs about self, feelings of guilt, worthlessness\par Treatment: CBT, psychoeducation, behavioral activation\par \par Problem: trauma-related symptoms, avoidance of emotions, negative beliefs about self and others, hyperarousal\par Treatment: psychoeducation, exposure therapy, CBT/CPT\par \par Problem: anxiety, rumination, worry, difficulties relaxing, irritability\par Treatment: grounding exercises, CBT, psychoeducation\par

## 2023-05-25 ENCOUNTER — APPOINTMENT (OUTPATIENT)
Dept: PSYCHIATRY | Facility: CLINIC | Age: 43
End: 2023-05-25
Payer: OTHER GOVERNMENT

## 2023-05-25 PROCEDURE — 90837 PSYTX W PT 60 MINUTES: CPT | Mod: 95

## 2023-05-25 NOTE — REASON FOR VISIT
[Patient preference] : as per patient preference [Continuing, patient seen in-person within last 12 months] : Telehealth services are continuing as patient has been seen in-person within last 12 months. [Telehealth (audio & video) - Individual/Group] : This visit was provided via telehealth using real-time 2-way audio visual technology. [Medical Office: ([facility])___] : The provider was located at the medical office in [unfilled]. [Home] : The patient, [unfilled], was located at home, [unfilled], at the time of the visit. [Verbal consent obtained from patient/other participant(s)] : Verbal consent for telehealth/telephonic services obtained from patient/other participant(s) [Patient] : Patient [FreeTextEntry4] : 12:00PM [FreeTextEntry5] : 1:00PM

## 2023-05-25 NOTE — PLAN
[Cognitive and/or Behavior Therapy] : Cognitive and/or Behavior Therapy  [Psychoeducation] : Psychoeducation  [Skills training (all types)] : Skills training (all types)  [Supportive Therapy] : Supportive Therapy [Recommended Frequency of Visits: ____] : Recommended frequency of visits: [unfilled] [Return in ____ week(s)] : Return in [unfilled] week(s) [de-identified] : Mr. Rosenberg arrived on time for today's appointment and was oriented to person, place, and time. Mr. Rosenberg shared and discussed ongoing planning for his upcoming [facility] party and recent decisions client has made with regard to the events of the day. Mr. Rosenberg and this provider briefly continued to explore and discuss client's discomfort with having a [facility] party and client's reactions were validated by this provider. Mr. Rosenberg also shared and discussed his decision to apologize to a coworker this morning after making a joke last night. Mr. Rosenberg and this provider further reviewed and discussed client's interaction with his coworker and his motivation for apologizing. In addition, Mr. Rosenberg and this provider challenged client's perception of himself as needing to apologize for his statement. Mr. Rosenberg and this provider briefly discussed client's experience of increased physical pain and client was provided with psychoeducation around chronic pain and pacing activities. Mr. Rosenberg also shared and discussed his perception of his increased pain as impacting his mood and resulting in periods of sadness since his last appointment. Mr. Rosenberg and this provider further explored and discussed client's report of episodes of sadness and client's connection between increased pain and worsening mood was normalized by this provider. Mr. Rosenberg and this provider worked to identify additional factors that have contributed to client's sadness including client reading an article about the loss of a former [employer], client perceiving himself as not being worth others wanting to connect with, and client missing his children. Mr. Rosenberg was encouraged to track his environment and cognitions during periods of sadness.  [FreeTextEntry1] : Continue weekly individual therapy to address symptoms of depression and trauma-related symptoms using a CBT-informed approach. \par \par Problem: depression, low mood, negative beliefs about self, feelings of guilt, worthlessness\par Treatment: CBT, psychoeducation, behavioral activation\par \par Problem: trauma-related symptoms, avoidance of emotions, negative beliefs about self and others, hyperarousal\par Treatment: psychoeducation, exposure therapy, CBT/CPT\par \par Problem: anxiety, rumination, worry, difficulties relaxing, irritability\par Treatment: grounding exercises, CBT, psychoeducation\par

## 2023-06-01 ENCOUNTER — APPOINTMENT (OUTPATIENT)
Dept: PSYCHIATRY | Facility: CLINIC | Age: 43
End: 2023-06-01
Payer: OTHER GOVERNMENT

## 2023-06-01 PROCEDURE — 90837 PSYTX W PT 60 MINUTES: CPT | Mod: 95

## 2023-06-01 NOTE — REASON FOR VISIT
[Patient preference] : as per patient preference [Continuing, patient seen in-person within last 12 months] : Telehealth services are continuing as patient has been seen in-person within last 12 months. [Telehealth (audio & video) - Individual/Group] : This visit was provided via telehealth using real-time 2-way audio visual technology. [Medical Office: ([facility])___] : The provider was located at the medical office in [unfilled]. [Home] : The patient, [unfilled], was located at home, [unfilled], at the time of the visit. [Verbal consent obtained from patient/other participant(s)] : Verbal consent for telehealth/telephonic services obtained from patient/other participant(s) [Patient] : Patient [FreeTextEntry4] : 12:00PM [FreeTextEntry5] : 1:02PM

## 2023-06-01 NOTE — PHYSICAL EXAM
[Average] : average [Euthymic] : euthymic [Cooperative] : cooperative [Full] : full [Clear] : clear [Linear/Goal Directed] : linear/goal directed [WNL] : within normal limits

## 2023-06-01 NOTE — PLAN
[Cognitive and/or Behavior Therapy] : Cognitive and/or Behavior Therapy  [Skills training (all types)] : Skills training (all types)  [Supportive Therapy] : Supportive Therapy [Recommended Frequency of Visits: ____] : Recommended frequency of visits: [unfilled] [Return in ____ week(s)] : Return in [unfilled] week(s) [de-identified] : Mr. Rosenberg arrived on time for today's appointment and was oriented to person, place, and time. Mr. Rosenberg briefly shared and discussed his experience this morning at his son's pre-k graduation. In addition, Mr. Rosenberg briefly reflected on and discussed his sadness and worry after watching a video on the train earlier this weak and wanting to avoid becoming a  statistic. Mr. Rosenberg's reactions were validated by this provider. Mr. Rosenberg and this provider also briefly reflected on and discussed client's reaction to receiving positive feedback at work and his acknowledgement of his work-related performance. Mr. Rosenberg's feedback was celebrated by this provider. Mr. Rosenberg and this provider continued to explore and discuss client's upcoming shelter party and his continued anxiety related to this party. Mr. Rosenberg shared and discussed the sequence of events for the party as well as his areas of concern throughout the day, including client's own remarks and the flag folding ceremony. Mr. Rosenberg's concerns were validated by this provider. In addition, Mr. Rosenberg and this provider identified client's planned use of coping skills during these events and client's proactive use of coping skills was validated by this provider. Mr. Rosenberg and this provider continued to reflect on and discuss client's feelings related to his shelter party as well as client's reactions/feelings to his career in the [employer]. Mr. Rosenberg shared and discussed areas of accomplishment, happiness, regret, disappointment, and frustration. This provider reflected back to client his pattern of labeling and intellectualizing his emotions and client was provided with psychoeducation around differences between labeling and experiencing emotion. Mr. Rosenberg was challenged to identify emotions experienced during his shelter party after the completion of his party. Mr. Rosenberg and this provider also briefly identified and discussed techniques to help client remain grounded throughout his party.   [FreeTextEntry1] : Continue weekly individual therapy to address symptoms of depression and trauma-related symptoms using a CBT-informed approach. \par \par Problem: depression, low mood, negative beliefs about self, feelings of guilt, worthlessness\par Treatment: CBT, psychoeducation, behavioral activation\par \par Problem: trauma-related symptoms, avoidance of emotions, negative beliefs about self and others, hyperarousal\par Treatment: psychoeducation, exposure therapy, CBT/CPT\par \par Problem: anxiety, rumination, worry, difficulties relaxing, irritability\par Treatment: grounding exercises, CBT, psychoeducation\par

## 2023-06-08 ENCOUNTER — APPOINTMENT (OUTPATIENT)
Dept: PSYCHIATRY | Facility: CLINIC | Age: 43
End: 2023-06-08
Payer: OTHER GOVERNMENT

## 2023-06-08 PROCEDURE — 90837 PSYTX W PT 60 MINUTES: CPT | Mod: 95

## 2023-06-08 NOTE — REASON FOR VISIT
[Patient preference] : as per patient preference [Continuing, patient seen in-person within last 12 months] : Telehealth services are continuing as patient has been seen in-person within last 12 months. [Telehealth (audio & video) - Individual/Group] : This visit was provided via telehealth using real-time 2-way audio visual technology. [Medical Office: ([facility])___] : The provider was located at the medical office in [unfilled]. [Home] : The patient, [unfilled], was located at home, [unfilled], at the time of the visit. [Verbal consent obtained from patient/other participant(s)] : Verbal consent for telehealth/telephonic services obtained from patient/other participant(s) [Patient] : Patient [FreeTextEntry4] : 12:00PM [FreeTextEntry5] : 1:01PM

## 2023-06-08 NOTE — PLAN
[Cognitive and/or Behavior Therapy] : Cognitive and/or Behavior Therapy  [Psychoeducation] : Psychoeducation  [Skills training (all types)] : Skills training (all types)  [Supportive Therapy] : Supportive Therapy [de-identified] : Mr. Rosenberg arrived on time for today's appointment and was oriented to person, place, and time. Mr. Rosenberg shared and discussed his recent [employer] party, client's relief around the event being completed, and his reactions to the event itself. Mr. Rosenberg and this provider reviewed and discussed specific portions of the event and strategies that were helpful to client in managing his reactions including having his son near and focusing on the reactions of others. Mr. Rosenberg's use of coping skills was validated by this provider. Mr. 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 Mr. Rosenberg's discomfort in being the center of attention was validated by this provider. Mr. 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 Mr. Rosenberg and this provider also continued to explore and discuss his actions and accomplishments as being routine as client believes others in his position would have engaged in similar manners. This provider reflected back to client his pattern of crediting other individuals for their decisions/choices while minimizing his own. Mr. Rosenberg and this provider also identified and discussed client's perception of failure as well as times when client has perceived himself to have failed and worked to challenge client's perception of failing. Finally, Mr. Rosenberg reported increased physical pain following his [employer] party. Mr. Rosenberg and this provider reviewed and discussed client's engagement in physical activity and continued to discuss the pros and cons of client pacing physical activity.  [Recommended Frequency of Visits: ____] : Recommended frequency of visits: [unfilled] [Return in ____ week(s)] : Return in [unfilled] week(s) [FreeTextEntry1] : Continue weekly individual therapy to address symptoms of depression and trauma-related symptoms using a CBT-informed approach. \par \par Problem: depression, low mood, negative beliefs about self, feelings of guilt, worthlessness\par Treatment: CBT, psychoeducation, behavioral activation\par \par Problem: trauma-related symptoms, avoidance of emotions, negative beliefs about self and others, hyperarousal\par Treatment: psychoeducation, exposure therapy, CBT/CPT\par \par Problem: anxiety, rumination, worry, difficulties relaxing, irritability\par Treatment: grounding exercises, CBT, psychoeducation\par

## 2023-06-12 ENCOUNTER — NON-APPOINTMENT (OUTPATIENT)
Age: 43
End: 2023-06-12

## 2023-06-16 ENCOUNTER — APPOINTMENT (OUTPATIENT)
Dept: PSYCHIATRY | Facility: CLINIC | Age: 43
End: 2023-06-16

## 2023-06-16 ENCOUNTER — NON-APPOINTMENT (OUTPATIENT)
Age: 43
End: 2023-06-16

## 2023-06-19 ENCOUNTER — APPOINTMENT (OUTPATIENT)
Dept: PSYCHIATRY | Facility: CLINIC | Age: 43
End: 2023-06-19
Payer: OTHER GOVERNMENT

## 2023-06-19 PROCEDURE — 90837 PSYTX W PT 60 MINUTES: CPT | Mod: 95

## 2023-06-19 NOTE — REASON FOR VISIT
[Patient preference] : as per patient preference [Continuing, patient seen in-person within last 12 months] : Telehealth services are continuing as patient has been seen in-person within last 12 months. [Telehealth (audio & video) - Individual/Group] : This visit was provided via telehealth using real-time 2-way audio visual technology. [Medical Office: ([facility])___] : The provider was located at the medical office in [unfilled]. [Home] : The patient, [unfilled], was located at home, [unfilled], at the time of the visit. [Verbal consent obtained from patient/other participant(s)] : Verbal consent for telehealth/telephonic services obtained from patient/other participant(s) [Patient] : Patient [FreeTextEntry4] : 11:07 [FreeTextEntry5] : 12:01PM

## 2023-06-19 NOTE — PHYSICAL EXAM
[Average] : average [Cooperative] : cooperative [Full] : full [Clear] : clear [Linear/Goal Directed] : linear/goal directed [WNL] : within normal limits [FreeTextEntry8] : overwhelmed

## 2023-06-19 NOTE — PLAN
[Cognitive and/or Behavior Therapy] : Cognitive and/or Behavior Therapy  [Psychoeducation] : Psychoeducation  [Supportive Therapy] : Supportive Therapy [Skills training (all types)] : Skills training (all types)  [Recommended Frequency of Visits: ____] : Recommended frequency of visits: [unfilled] [Return in ____ week(s)] : Return in [unfilled] week(s) [de-identified] : Mr. Rosenberg arrived on time for today's appointment and was oriented to person, place, and time. Mr. Rosenberg's appointment began late due to technical difficulties at the beginning of the appointment. Mr. Rosenberg reviewed and discussed recent stressors including his wife's hospitalization and subsequent surgery. Mr. Rosenberg and this provider explored and discussed client's reaction to his wife's hospitalization and client identified feelings of worry, helplessness, and fear. Mr. Rosenberg's reactions were normalized by this provider. Mr. Rosenberg also shared and reflected on his perception of himself as being overwhelmed by various responsibilities including balancing childcare, work, and his wife's recovery. Mr. Rosenberg and this provider reviewed and discussed client's current efforts to balance various responsibilities as well as his concerns about the perception of his coworkers given client's absence from work. Mr. Rosenberg and this provider continued to review and discuss client's current stressors and responsibilities. In addition, Mr. Rosenberg and this provider identified and discussed client's use of self-care and coping strategies to manage these various stressors. Mr. Rosenberg shared and reflected on his perception of himself as being selfish for taking time for himself. Mr. Rosenberg was provided with psychoeducation around self-care. In addition, Mr. Rosenberg and this provider worked to challenge client's perception of himself as being selfish. Mr. Rosenberg was challenged to incorporate time for himself into his routine as client works to navigate various stressors. Mr. Rosenberg briefly reviewed previous discussions around client meeting with a psychiatrist. Mr. Rosenberg and this provider briefly identified and discussed the pros and cons of client meeting with a psychiatrist and the process for client connecting with a psychiatrist. Mr. Rosenberg and this provider agreed to follow-up regarding client's interest in a psychiatric consultation at client's next appointment.  [FreeTextEntry1] : Continue weekly individual therapy to address symptoms of depression and trauma-related symptoms using a CBT-informed approach. \par \par Problem: depression, low mood, negative beliefs about self, feelings of guilt, worthlessness\par Treatment: CBT, psychoeducation, behavioral activation\par \par Problem: trauma-related symptoms, avoidance of emotions, negative beliefs about self and others, hyperarousal\par Treatment: psychoeducation, exposure therapy, CBT/CPT\par \par Problem: anxiety, rumination, worry, difficulties relaxing, irritability\par Treatment: grounding exercises, CBT, psychoeducation\par

## 2023-06-29 ENCOUNTER — APPOINTMENT (OUTPATIENT)
Dept: PSYCHIATRY | Facility: CLINIC | Age: 43
End: 2023-06-29
Payer: OTHER GOVERNMENT

## 2023-06-29 PROCEDURE — 90837 PSYTX W PT 60 MINUTES: CPT | Mod: 95

## 2023-06-29 NOTE — REASON FOR VISIT
[Patient preference] : as per patient preference [Continuing, patient seen in-person within last 12 months] : Telehealth services are continuing as patient has been seen in-person within last 12 months. [Telehealth (audio & video) - Individual/Group] : This visit was provided via telehealth using real-time 2-way audio visual technology. [Medical Office: ([facility])___] : The provider was located at the medical office in [unfilled]. [Home] : The patient, [unfilled], was located at home, [unfilled], at the time of the visit. [Verbal consent obtained from patient/other participant(s)] : Verbal consent for telehealth/telephonic services obtained from patient/other participant(s) [Patient] : Patient [FreeTextEntry4] : 12:00PM [FreeTextEntry5] : 1:00PM

## 2023-06-29 NOTE — RISK ASSESSMENT
[facility], [place]    Brief Operative Note    Pre-operative diagnosis: Bladder Stone  Post-operative diagnosis * No post-op diagnosis entered *  Procedure: Procedure(s):  Cystolithopaxy with Holmium Laser Enucleation Of The Prostate  Surgeon: Surgeon(s) and Role:     * Arthur Mederos MD - Primary     * Qasim Gant MD - Resident - Assisting  Anesthesia: General   Estimated blood loss: 200 ml  Drains: 22 Fr 3 way catheter, 75 ml in the balloon  Specimens:   ID Type Source Tests Collected by Time Destination   1 : Bladder Stone Calculus/Stone Bladder STONE ANALYSIS Arthur Mederos MD 6/6/2019  6:47 PM    A : Prostate Tissue Prostate SURGICAL PATHOLOGY EXAM Arthur Mederos MD 6/6/2019  7:55 PM      Findings:   unremarkable HoLEP, 142g prostate tissue enucleated.  Complications: None.  Implants:  * No implants in log *          [No, patient denies ideation or behavior] : No, patient denies ideation or behavior [Low acute suicide risk] : Low acute suicide risk [No] : No [Not clinically indicated] : Safety Plan completed/updated (for individuals at risk): Not clinically indicated

## 2023-06-29 NOTE — PLAN
[Cognitive and/or Behavior Therapy] : Cognitive and/or Behavior Therapy  [Psychoeducation] : Psychoeducation  [Skills training (all types)] : Skills training (all types)  [Supportive Therapy] : Supportive Therapy [Recommended Frequency of Visits: ____] : Recommended frequency of visits: [unfilled] [Return in ____ week(s)] : Return in [unfilled] week(s) [de-identified] : Mr. Rosenberg arrived on time for today's appointment and was oriented to person, place, and time. Mr. Rosenberg shared and discussed his ongoing psychosocial stressors and his perception of himself as being numb in response to these stressors. In addition, Mr. Rosenberg reflected on and discussed interpersonal stressors and conflicts that have resulted in client limiting his interactions with particular individuals. Mr. Rosenberg's experiences and reactions were normalized and validated by this provider. Mr. Rosenberg and this provider identified and discussed client's support network and ways in which he has felt both supported and unsupported by these individuals. Mr. Rosenberg and this provider continued to review and discuss client's work and family-related responsibilities and stressors and client's focus on managing these stressors individually. In addition, Mr. Rosenberg and this provider continued to review and discuss the benefits and necessity of client engaging in self-care and meeting his own needs. Mr. Rosenberg and this provider continued to identify and discuss ways client might create time for himself in his routine, including at night and began to identify activities that might be helpful to client. Mr. Rosenberg was challenged to engage in self-care and/or pleasant activities prior to his next appointment. In addition, Mr. Rosenberg and this provider agreed to further discuss barriers to self-care (e.g. client's perception of being selfish and time) and client's interest in a psychiatric consultation at his next appointment.  [FreeTextEntry1] : Continue weekly individual therapy to address symptoms of depression and trauma-related symptoms using a CBT-informed approach. \par \par Problem: depression, low mood, negative beliefs about self, feelings of guilt, worthlessness\par Treatment: CBT, psychoeducation, behavioral activation\par \par Problem: trauma-related symptoms, avoidance of emotions, negative beliefs about self and others, hyperarousal\par Treatment: psychoeducation, exposure therapy, CBT/CPT\par \par Problem: anxiety, rumination, worry, difficulties relaxing, irritability\par Treatment: grounding exercises, CBT, psychoeducation\par

## 2023-07-06 ENCOUNTER — NON-APPOINTMENT (OUTPATIENT)
Age: 43
End: 2023-07-06

## 2023-07-06 ENCOUNTER — APPOINTMENT (OUTPATIENT)
Dept: PSYCHIATRY | Facility: CLINIC | Age: 43
End: 2023-07-06

## 2023-07-13 ENCOUNTER — NON-APPOINTMENT (OUTPATIENT)
Age: 43
End: 2023-07-13

## 2023-07-13 ENCOUNTER — APPOINTMENT (OUTPATIENT)
Dept: PSYCHIATRY | Facility: CLINIC | Age: 43
End: 2023-07-13

## 2023-07-20 ENCOUNTER — APPOINTMENT (OUTPATIENT)
Dept: PSYCHIATRY | Facility: CLINIC | Age: 43
End: 2023-07-20
Payer: OTHER GOVERNMENT

## 2023-07-20 PROCEDURE — 90837 PSYTX W PT 60 MINUTES: CPT | Mod: 95

## 2023-07-20 NOTE — PLAN
[Cognitive and/or Behavior Therapy] : Cognitive and/or Behavior Therapy  [Psychoeducation] : Psychoeducation  [Skills training (all types)] : Skills training (all types)  [Supportive Therapy] : Supportive Therapy [Recommended Frequency of Visits: ____] : Recommended frequency of visits: [unfilled] [Return in ____ week(s)] : Return in [unfilled] week(s) [de-identified] : Mr. Rosenberg arrived on time for today's appointment and was oriented to person, place, and time. Mr. Rosenberg shared and discussed client's experience of various stressors since his last appointment with this provider. Mr. Rosenberg and this provider reviewed and discussed client's reported distress and discomfort over the Fourth of July and his strategies for coping with his discomfort. Mr. Rosenberg's experiences were normalized by this provider. In addition, Mr. Rosenberg and this provider reflected on and discussed the circumstances that have contributed to client's unease on this holiday. Mr. Rosenberg and this provider also continued to review and discuss client's perception of being numb to other stressors at home. Mr. Rosenberg shared and reflected on his vacation from work and his recent return to the office. Mr. Rosenberg and this provider continued to reflect on and discuss client's reaction to his current stressors and continued barriers to client engaging in self-care and allowing himself time alone. In addition, Mr. Rosenberg and this provider worked to problem-solve client's identified barriers, including setting boundaries and limits with others. Further, Mr. Rosebnerg and this provider explored, discussed, and worked to challenge client's feelings of guilt when prioritizing himself and his interests.  [FreeTextEntry1] : Continue weekly individual therapy to address symptoms of depression and trauma-related symptoms using a CBT-informed approach. \par \par Problem: depression, low mood, negative beliefs about self, feelings of guilt, worthlessness\par Treatment: CBT, psychoeducation, behavioral activation\par \par Problem: trauma-related symptoms, avoidance of emotions, negative beliefs about self and others, hyperarousal\par Treatment: psychoeducation, exposure therapy, CBT/CPT\par \par Problem: anxiety, rumination, worry, difficulties relaxing, irritability\par Treatment: grounding exercises, CBT, psychoeducation\par

## 2023-07-20 NOTE — REASON FOR VISIT
[Patient preference] : as per patient preference [Continuing, patient seen in-person within last 12 months] : Telehealth services are continuing as patient has been seen in-person within last 12 months. [Telehealth (audio & video) - Individual/Group] : This visit was provided via telehealth using real-time 2-way audio visual technology. [Medical Office: ([facility])___] : The provider was located at the medical office in [unfilled]. [Home] : The patient, [unfilled], was located at home, [unfilled], at the time of the visit. [Verbal consent obtained from patient/other participant(s)] : Verbal consent for telehealth/telephonic services obtained from patient/other participant(s) [Patient] : Patient [FreeTextEntry4] : 12:00PM [FreeTextEntry5] : 1:00PM

## 2023-07-27 ENCOUNTER — NON-APPOINTMENT (OUTPATIENT)
Age: 43
End: 2023-07-27

## 2023-07-28 ENCOUNTER — APPOINTMENT (OUTPATIENT)
Dept: PSYCHIATRY | Facility: CLINIC | Age: 43
End: 2023-07-28

## 2023-07-28 ENCOUNTER — NON-APPOINTMENT (OUTPATIENT)
Age: 43
End: 2023-07-28

## 2023-08-03 ENCOUNTER — APPOINTMENT (OUTPATIENT)
Dept: PSYCHIATRY | Facility: CLINIC | Age: 43
End: 2023-08-03
Payer: OTHER GOVERNMENT

## 2023-08-03 PROCEDURE — 90837 PSYTX W PT 60 MINUTES: CPT | Mod: 95

## 2023-08-03 NOTE — REASON FOR VISIT
[Patient preference] : as per patient preference [Continuing, patient seen in-person within last 12 months] : Telehealth services are continuing as patient has been seen in-person within last 12 months. [Telehealth (audio & video) - Individual/Group] : This visit was provided via telehealth using real-time 2-way audio visual technology. [Medical Office: ([facility])___] : The provider was located at the medical office in [unfilled]. [Home] : The patient, [unfilled], was located at home, [unfilled], at the time of the visit. [Verbal consent obtained from patient/other participant(s)] : Verbal consent for telehealth/telephonic services obtained from patient/other participant(s) [Patient] : Patient [FreeTextEntry4] : 12:01PM [FreeTextEntry5] : 1:02PM

## 2023-08-03 NOTE — PHYSICAL EXAM
[Average] : average [Cooperative] : cooperative [Full] : full [Clear] : clear [Linear/Goal Directed] : linear/goal directed [WNL] : within normal limits [FreeTextEntry8] : "okay" [de-identified] : Mr. Rosenberg was tearful, at times, during today's appointment.

## 2023-08-03 NOTE — PLAN
[Cognitive and/or Behavior Therapy] : Cognitive and/or Behavior Therapy  [Psychoeducation] : Psychoeducation  [Skills training (all types)] : Skills training (all types)  [Supportive Therapy] : Supportive Therapy [de-identified] : Mr. Rosenberg arrived on time for today's appointment and was oriented to person, place, and time. Mr. Rosenberg shared and discussed recent psychosocial stressors including increased responsibilities at work and preparing his parents to travel abroad. Mr. Rosenberg's experiences and reactions were normalized by this provider. Mr. Rosenberg also shared and reflected on the recent, unexpected death of his family's dog and his reactions to this loss. Specifically, Mr. Rosenberg and this provider explored and discussed client's experience of bringing his dog to an emergency vet and his feelings of fear and helplessness. In addition, Mr. 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 Mr. Rosenberg's reactions were normalized by this provider. In addition, Mr. Rosenberg was provided with psychoeducation around trauma and trauma reactions. Further, Mr. Rosenberg and this provider worked to identify client's current emotions related to the loss of his dog including feelings of sadness. Mr. Rosenberg and this provider reviewed and discussed client's efforts to support his children and his wife during this time. In addition, Mr. Rosenberg and this provider discussed client's efforts to support a friend in their own worry. This provider continued to reflect back to client his pattern of prioritizing/focusing on others to avoid his own needs and emotions. Mr. Rosenberg and this provider continued to discuss client's experience of guilt when considering himself and the impact of his guilt on his engagement in self-care.  [Recommended Frequency of Visits: ____] : Recommended frequency of visits: [unfilled] [Return in ____ week(s)] : Return in [unfilled] week(s) [FreeTextEntry1] : Continue weekly individual therapy to address symptoms of depression and trauma-related symptoms using a CBT-informed approach.  Problem: depression, low mood, negative beliefs about self, feelings of guilt, worthlessness Treatment: CBT, psychoeducation, behavioral activation  Problem: trauma-related symptoms, avoidance of emotions, negative beliefs about self and others, hyperarousal Treatment: psychoeducation, exposure therapy, CBT/CPT  Problem: anxiety, rumination, worry, difficulties relaxing, irritability Treatment: grounding exercises, CBT, psychoeducation

## 2023-08-09 ENCOUNTER — NON-APPOINTMENT (OUTPATIENT)
Age: 43
End: 2023-08-09

## 2023-08-10 ENCOUNTER — APPOINTMENT (OUTPATIENT)
Dept: PSYCHIATRY | Facility: CLINIC | Age: 43
End: 2023-08-10

## 2023-08-17 ENCOUNTER — APPOINTMENT (OUTPATIENT)
Dept: PSYCHIATRY | Facility: CLINIC | Age: 43
End: 2023-08-17
Payer: OTHER GOVERNMENT

## 2023-08-17 PROCEDURE — 90837 PSYTX W PT 60 MINUTES: CPT | Mod: 95

## 2023-08-17 NOTE — REASON FOR VISIT
[Patient preference] : as per patient preference [Continuing, patient seen in-person within last 12 months] : Telehealth services are continuing as patient has been seen in-person within last 12 months. [Telehealth (audio & video) - Individual/Group] : This visit was provided via telehealth using real-time 2-way audio visual technology. [Medical Office: ([facility])___] : The provider was located at the medical office in [unfilled]. [Home] : The patient, [unfilled], was located at home, [unfilled], at the time of the visit. [Verbal consent obtained from patient/other participant(s)] : Verbal consent for telehealth/telephonic services obtained from patient/other participant(s) [Patient] : Patient [FreeTextEntry4] : 12:01PM [FreeTextEntry5] : 12:54PM

## 2023-08-17 NOTE — PLAN
[Cognitive and/or Behavior Therapy] : Cognitive and/or Behavior Therapy  [Psychoeducation] : Psychoeducation  [Skills training (all types)] : Skills training (all types)  [Supportive Therapy] : Supportive Therapy [Recommended Frequency of Visits: ____] : Recommended frequency of visits: [unfilled] [Return in ____ week(s)] : Return in [unfilled] week(s) [de-identified] : Mr. Rosenberg arrived on time for today's appointment and was oriented to person, place, and time. Mr. Rosenberg shared and discussed concerns around his reactions to two recent events, one at work and one at home. Mr. Rosenberg and this provider reviewed and discussed client's recent interactions with his boss at work and resulting feelings of anger, frustration, and worry. Mr. Rosenberg's reactions were normalized by this provider. In addition, Mr. Rosenberg and this provider further reflected on and discussed client's verbal and behavioral responses at work and challenged client's perception of himself as being rude and/or angry towards others. Mr. Rosenberg and this provider also continued to review and discuss client's relationship with his boss and ways in which client might communicate to his boss his feelings of discomfort with specific statements/behaviors. Mr. Rosenberg and this provider reflected on and discussed client's concerns related to a recent reaction to home stressors. Mr. Rosenberg shared and discussed the circumstances surrounding his reaction and client's anger and frustration were normalized by this provider. Mr. Rosenberg and this provider also began to identify and reflect on client's perception of himself as sharing this reaction with others in order to punish and/or shame himself for the reaction. Mr. Rosenberg and this provider continued to review and discuss client's increased stress and burden of care as well as client's difficulties meeting his own needs for self-care and stress management. Finally, Mr. Rosenberg and this provider reviewed and discussed client's recent efforts to engage in mindfulness of emotions exercises and client was provided with psychoeducation around the use of these mindfulness exercises as well as the importance of practicing these skills during periods of mild to moderate intensity emotions.  [FreeTextEntry1] : Continue weekly individual therapy to address symptoms of depression and trauma-related symptoms using a CBT-informed approach.  Problem: depression, low mood, negative beliefs about self, feelings of guilt, worthlessness Treatment: CBT, psychoeducation, behavioral activation  Problem: trauma-related symptoms, avoidance of emotions, negative beliefs about self and others, hyperarousal Treatment: psychoeducation, exposure therapy, CBT/CPT  Problem: anxiety, rumination, worry, difficulties relaxing, irritability Treatment: grounding exercises, CBT, psychoeducation

## 2023-08-24 ENCOUNTER — APPOINTMENT (OUTPATIENT)
Dept: PSYCHIATRY | Facility: CLINIC | Age: 43
End: 2023-08-24
Payer: OTHER GOVERNMENT

## 2023-08-24 PROCEDURE — 90837 PSYTX W PT 60 MINUTES: CPT | Mod: 95

## 2023-08-24 NOTE — PLAN
[Cognitive and/or Behavior Therapy] : Cognitive and/or Behavior Therapy  [Psychoeducation] : Psychoeducation  [Skills training (all types)] : Skills training (all types)  [Supportive Therapy] : Supportive Therapy [Recommended Frequency of Visits: ____] : Recommended frequency of visits: [unfilled] [Return in ____ week(s)] : Return in [unfilled] week(s) [de-identified] : Mr. Rosenberg arrived on time for today's appointment and was oriented to person, place, and time. Mr. Rosenberg shared and discussed his confusion around feelings of sadness and tearfulness he experienced yesterday within the context of a text conversation with a close friend. Mr. Rosenberg and this provider further explored and discussed client's interaction with his friend, his friend's disclosure of being nervous/worried about client, and client's subsequent sadness and tearfulness. Mr. Rosenberg and this provider also identified client's feelings of guilt that others are worried about him. Further, Mr. Rosenberg expressed feelings of sadness related to the loss of his ability to cope with his emotions and psychosocial stress. Mr. Rosenberg shared his concerns related to the changes he has noticed in his ability to cope with stress since leaving the . Mr. Rosenberg was provided with psychoeducation around the impact of stress on coping and avoidance, especially during periods of transition including client's recent long term, family medical concerns and losses, and entrance into the civilian work force. Mr. Rosenberg and this provider continued to review and discuss ways client might create time for self-care to reduce the impact of his overall stress. In addition, Mr. Rosenberg and this provider briefly reflected on and discussed the pros and cons of client engaging in a trauma-focused treatment to reduce the impact of past experiences on his current functioning.  [FreeTextEntry1] : Continue weekly individual therapy to address symptoms of depression and trauma-related symptoms using a CBT-informed approach.  Problem: depression, low mood, negative beliefs about self, feelings of guilt, worthlessness Treatment: CBT, psychoeducation, behavioral activation  Problem: trauma-related symptoms, avoidance of emotions, negative beliefs about self and others, hyperarousal Treatment: psychoeducation, exposure therapy, CBT/CPT  Problem: anxiety, rumination, worry, difficulties relaxing, irritability Treatment: grounding exercises, CBT, psychoeducation

## 2023-08-24 NOTE — REASON FOR VISIT
[Patient preference] : as per patient preference [Continuing, patient not seen in-person within last 12 months (provide details below)] : Telehealth services are continuing, patient not seen in-person within last 12 months.  [Telehealth (audio & video) - Individual/Group] : This visit was provided via telehealth using real-time 2-way audio visual technology. [Medical Office: ([facility])___] : The provider was located at the medical office in [unfilled]. [Home] : The patient, [unfilled], was located at home, [unfilled], at the time of the visit. [Verbal consent obtained from patient/other participant(s)] : Verbal consent for telehealth/telephonic services obtained from patient/other participant(s) [Patient] : Patient [FreeTextEntry4] : 12:00PM [FreeTextEntry5] : 12:55PM

## 2023-08-28 ENCOUNTER — APPOINTMENT (OUTPATIENT)
Dept: PSYCHIATRY | Facility: CLINIC | Age: 43
End: 2023-08-28
Payer: OTHER GOVERNMENT

## 2023-08-28 PROCEDURE — 90837 PSYTX W PT 60 MINUTES: CPT | Mod: 95

## 2023-08-28 NOTE — PLAN
[Cognitive and/or Behavior Therapy] : Cognitive and/or Behavior Therapy  [Psychoeducation] : Psychoeducation  [Skills training (all types)] : Skills training (all types)  [Supportive Therapy] : Supportive Therapy [de-identified] : Mr. Rosenberg arrived on time for today's appointment and was oriented to person, place, and time. Mr. Rosenberg shared and discussed current feelings of uneasiness within the context of his partner's worsening medical condition. Mr. Rosenberg and this provider further reviewed and discussed changes in his wife's health and client's feelings of nervousness and uneasiness as a result. In addition, Mr. Rosenberg shared and discussed negative thoughts he has experienced about potential future outcomes of his wife's health. Mr. Rosenberg's experiences and reactions were normalized and validated by this provider. In addition, Mr. Rosenberg and this provider identified and reflected on client's pattern of problem-solving and planning to cope with negative emotions. Mr. Rosenberg and this provider also identified and discussed the pros and cons of client traveling late next week to spend time with friends from the . Finally, Mr. Rosenberg and this provider briefly reflected on and discussed a recent conversation between client and his friend around expressing emotions. Mr. Rosenberg and this provider continued to explore and discuss client's beliefs around emotions, the stigma associated with expressing emotions, and client's own history of being invalidated when he has expressed his emotions. Mr. Rosenberg and this provider continued to identify and reflect on the benefits of client increasing his comfort with his own emotions. In addition, Mr. Rosenberg and this provider continued to review and discuss client's client current self-care practices given his wife's health concerns and client's ongoing psychosocial stressors.  [Recommended Frequency of Visits: ____] : Recommended frequency of visits: [unfilled] [Return in ____ week(s)] : Return in [unfilled] week(s) [FreeTextEntry1] : Continue weekly individual therapy to address symptoms of depression and trauma-related symptoms using a CBT-informed approach. Next appointment scheduled for 9/14/23 at 12PM due to scheduled provider absences on 8/31/23 and 9/7/23.   Problem: depression, low mood, negative beliefs about self, feelings of guilt, worthlessness Treatment: CBT, psychoeducation, behavioral activation  Problem: trauma-related symptoms, avoidance of emotions, negative beliefs about self and others, hyperarousal Treatment: psychoeducation, exposure therapy, CBT/CPT  Problem: anxiety, rumination, worry, difficulties relaxing, irritability Treatment: grounding exercises, CBT, psychoeducation

## 2023-08-28 NOTE — REASON FOR VISIT
[Patient preference] : as per patient preference [Continuing, patient seen in-person within last 12 months] : Telehealth services are continuing as patient has been seen in-person within last 12 months. [Telehealth (audio & video) - Individual/Group] : This visit was provided via telehealth using real-time 2-way audio visual technology. [Other Location: e.g. Home (Enter Location, City,State)___] : The provider was located at [unfilled]. [Home] : The patient, [unfilled], was located at home, [unfilled], at the time of the visit. [Verbal consent obtained from patient/other participant(s)] : Verbal consent for telehealth/telephonic services obtained from patient/other participant(s) [FreeTextEntry4] : 12:00PM [FreeTextEntry5] : 1:00PM [Patient] : Patient

## 2023-09-14 ENCOUNTER — APPOINTMENT (OUTPATIENT)
Dept: PSYCHIATRY | Facility: CLINIC | Age: 43
End: 2023-09-14
Payer: OTHER GOVERNMENT

## 2023-09-14 PROCEDURE — 90834 PSYTX W PT 45 MINUTES: CPT | Mod: 95

## 2023-09-21 ENCOUNTER — NON-APPOINTMENT (OUTPATIENT)
Age: 43
End: 2023-09-21

## 2023-09-22 ENCOUNTER — APPOINTMENT (OUTPATIENT)
Dept: PSYCHIATRY | Facility: CLINIC | Age: 43
End: 2023-09-22

## 2023-09-28 ENCOUNTER — APPOINTMENT (OUTPATIENT)
Dept: PSYCHIATRY | Facility: CLINIC | Age: 43
End: 2023-09-28
Payer: OTHER GOVERNMENT

## 2023-09-28 PROCEDURE — 90837 PSYTX W PT 60 MINUTES: CPT | Mod: 95

## 2023-10-06 ENCOUNTER — APPOINTMENT (OUTPATIENT)
Dept: PSYCHIATRY | Facility: CLINIC | Age: 43
End: 2023-10-06
Payer: OTHER GOVERNMENT

## 2023-10-06 PROCEDURE — 90837 PSYTX W PT 60 MINUTES: CPT | Mod: GT

## 2023-10-13 ENCOUNTER — APPOINTMENT (OUTPATIENT)
Dept: PSYCHIATRY | Facility: CLINIC | Age: 43
End: 2023-10-13
Payer: OTHER GOVERNMENT

## 2023-10-13 PROCEDURE — 90837 PSYTX W PT 60 MINUTES: CPT | Mod: 95

## 2023-10-19 ENCOUNTER — NON-APPOINTMENT (OUTPATIENT)
Age: 43
End: 2023-10-19

## 2023-10-19 ENCOUNTER — APPOINTMENT (OUTPATIENT)
Dept: PSYCHIATRY | Facility: CLINIC | Age: 43
End: 2023-10-19

## 2023-10-26 ENCOUNTER — APPOINTMENT (OUTPATIENT)
Dept: PSYCHIATRY | Facility: CLINIC | Age: 43
End: 2023-10-26
Payer: OTHER GOVERNMENT

## 2023-10-26 PROCEDURE — 90837 PSYTX W PT 60 MINUTES: CPT | Mod: 95

## 2023-11-02 ENCOUNTER — APPOINTMENT (OUTPATIENT)
Dept: PSYCHIATRY | Facility: CLINIC | Age: 43
End: 2023-11-02

## 2023-11-03 ENCOUNTER — APPOINTMENT (OUTPATIENT)
Dept: PSYCHIATRY | Facility: CLINIC | Age: 43
End: 2023-11-03
Payer: OTHER GOVERNMENT

## 2023-11-03 PROCEDURE — 90837 PSYTX W PT 60 MINUTES: CPT | Mod: GT

## 2023-11-08 ENCOUNTER — NON-APPOINTMENT (OUTPATIENT)
Age: 43
End: 2023-11-08

## 2023-11-09 ENCOUNTER — APPOINTMENT (OUTPATIENT)
Dept: PSYCHIATRY | Facility: CLINIC | Age: 43
End: 2023-11-09

## 2023-11-17 ENCOUNTER — APPOINTMENT (OUTPATIENT)
Dept: PSYCHIATRY | Facility: CLINIC | Age: 43
End: 2023-11-17
Payer: OTHER GOVERNMENT

## 2023-11-17 PROCEDURE — 90837 PSYTX W PT 60 MINUTES: CPT | Mod: GT

## 2023-11-20 ENCOUNTER — APPOINTMENT (OUTPATIENT)
Dept: PSYCHIATRY | Facility: CLINIC | Age: 43
End: 2023-11-20
Payer: OTHER GOVERNMENT

## 2023-11-20 PROCEDURE — 90837 PSYTX W PT 60 MINUTES: CPT | Mod: 95

## 2023-11-30 ENCOUNTER — APPOINTMENT (OUTPATIENT)
Dept: PSYCHIATRY | Facility: CLINIC | Age: 43
End: 2023-11-30
Payer: OTHER GOVERNMENT

## 2023-11-30 PROCEDURE — 90837 PSYTX W PT 60 MINUTES: CPT | Mod: 95

## 2023-12-07 ENCOUNTER — NON-APPOINTMENT (OUTPATIENT)
Age: 43
End: 2023-12-07

## 2023-12-08 ENCOUNTER — APPOINTMENT (OUTPATIENT)
Dept: PSYCHIATRY | Facility: CLINIC | Age: 43
End: 2023-12-08

## 2023-12-14 ENCOUNTER — APPOINTMENT (OUTPATIENT)
Dept: PSYCHIATRY | Facility: CLINIC | Age: 43
End: 2023-12-14
Payer: OTHER GOVERNMENT

## 2023-12-14 PROCEDURE — 90837 PSYTX W PT 60 MINUTES: CPT | Mod: 95

## 2023-12-14 NOTE — PLAN
[Cognitive and/or Behavior Therapy] : Cognitive and/or Behavior Therapy  [Psychoeducation] : Psychoeducation  [Skills training (all types)] : Skills training (all types)  [Supportive Therapy] : Supportive Therapy [de-identified] : Mr. Rosenberg arrived on time for today's appointment and was oriented to person, place, and time. Mr. Rosenberg shared and discussed recent stressors and accomplishments at home and at work. In addition, Mr. Rosenberg reviewed and discussed a recent dream/nightmare which resulted in client waking up to try and make himself vomit. Mr. Rosenberg and this provider initially focused on client's recent dream and his concerns related to the content of his dreams. Mr. Rosenberg's concerns were validated by this provider. In addition, Mr. Rosenberg was provided with psychoeducation around the impact of stress and emotions on the experience of dreams and nightmares. Mr. Rosenberg and this provider also reflected on and discussed recent interactions at work and at home. Mr. Rosenberg and this provider continued to identify and reflect on client's pattern of taking responsibility for other's behaviors, engaging in self-blame for the difficulties expressed by others, and minimizing his strengths and value. In addition, Mr. Rosenberg and this provider continued to challenge client's pattern of taking on more to reduce the impact of stress on others. Mr. Rosenberg also shared and discussed recent positive feedback he received from a superior at work and his reactions to this compliment. Mr. Rosenberg and this provider worked to challenge client's drive to minimize the compliment he received.  [Recommended Frequency of Visits: ____] : Recommended frequency of visits: [unfilled] [Return in ____ week(s)] : Return in [unfilled] week(s) [FreeTextEntry1] : Continue weekly individual therapy to address symptoms of depression and trauma-related symptoms using a CBT-informed approach.  Problem: depression, low mood, negative beliefs about self, feelings of guilt, worthlessness Treatment: CBT, psychoeducation, behavioral activation  Problem: trauma-related symptoms, avoidance of emotions, negative beliefs about self and others, hyperarousal Treatment: psychoeducation, exposure therapy, CBT/CPT  Problem: anxiety, rumination, worry, difficulties relaxing, irritability Treatment: grounding exercises, CBT, psychoeducation

## 2023-12-14 NOTE — REASON FOR VISIT
[Patient preference] : as per patient preference [Continuing, patient seen in-person within last 12 months] : Telehealth services are continuing as patient has been seen in-person within last 12 months. [Telehealth (audio & video) - Individual/Group] : This visit was provided via telehealth using real-time 2-way audio visual technology. [Medical Office: ([facility])___] : The provider was located at the medical office in [unfilled]. [Home] : The patient, [unfilled], was located at home, [unfilled], at the time of the visit. [Verbal consent obtained from patient/other participant(s)] : Verbal consent for telehealth/telephonic services obtained from patient/other participant(s) [FreeTextEntry4] : 12:02PM [FreeTextEntry5] : 1:02PM [Patient] : Patient

## 2023-12-21 ENCOUNTER — APPOINTMENT (OUTPATIENT)
Dept: PSYCHIATRY | Facility: CLINIC | Age: 43
End: 2023-12-21
Payer: OTHER GOVERNMENT

## 2023-12-21 PROCEDURE — 90837 PSYTX W PT 60 MINUTES: CPT | Mod: 95

## 2023-12-21 NOTE — PLAN
[Cognitive and/or Behavior Therapy] : Cognitive and/or Behavior Therapy  [Psychoeducation] : Psychoeducation  [Skills training (all types)] : Skills training (all types)  [Supportive Therapy] : Supportive Therapy [Recommended Frequency of Visits: ____] : Recommended frequency of visits: [unfilled] [Return in ____ week(s)] : Return in [unfilled] week(s) [de-identified] : Mr. Rosenberg arrived on time for today's appointment and was oriented to person, place, and time. Mr. Rosenberg shared and discussed his sense of being overwhelmed by various psychosocial stressors since his last appointment. In addition, Mr. Rosenberg identified and discussed increased feelings of frustration related to these stressors. Mr. Rosenberg and this provider further explored and discussed client's interactions with his family and his ongoing drive to take on the stress and needs of others. Mr. Rosenberg and this provider also began to identify and discuss the effects over time of client overextending himself within his family. Mr. Rosenberg and this provider continued to review and discuss client's individual needs and ways in which client might balance these needs with his family-related responsibilities. In addition, Mr. Rosenberg was provided with feedback around his role within and engagement with his children. At the end of today's session, Mr. Rosenberg expressed concerns around seeking validation from others by identifying his stressors and sharing them with others. In addition, Mr. Rosenberg briefly shared and reflected on his recent experience reviewing his behavioral health notes and his perception of himself as complaining. Mr. Rosenberg was provided with psychoeducation around therapy and client's reactions to stressors/trauma were validated by this provider. Mr. Rosenberg and this provider agreed to further discuss client's concerns about seeking validation as well as his perception of himself as complaining during client's next appointment with this provider.  [FreeTextEntry1] : Continue weekly individual therapy to address symptoms of depression and trauma-related symptoms using a CBT-informed approach.  Problem: depression, low mood, negative beliefs about self, feelings of guilt, worthlessness Treatment: CBT, psychoeducation, behavioral activation  Problem: trauma-related symptoms, avoidance of emotions, negative beliefs about self and others, hyperarousal Treatment: psychoeducation, exposure therapy, CBT/CPT  Problem: anxiety, rumination, worry, difficulties relaxing, irritability Treatment: grounding exercises, CBT, psychoeducation

## 2023-12-21 NOTE — REASON FOR VISIT
[Patient preference] : as per patient preference [Continuing, patient seen in-person within last 12 months] : Telehealth services are continuing as patient has been seen in-person within last 12 months. [Telehealth (audio & video) - Individual/Group] : This visit was provided via telehealth using real-time 2-way audio visual technology. [Medical Office: ([facility])___] : The provider was located at the medical office in [unfilled]. [Home] : The patient, [unfilled], was located at home, [unfilled], at the time of the visit. [Verbal consent obtained from patient/other participant(s)] : Verbal consent for telehealth/telephonic services obtained from patient/other participant(s) [Patient] : Patient [FreeTextEntry4] : 12:00PM [FreeTextEntry5] : 1:15PM

## 2023-12-28 ENCOUNTER — APPOINTMENT (OUTPATIENT)
Dept: PSYCHIATRY | Facility: CLINIC | Age: 43
End: 2023-12-28
Payer: OTHER GOVERNMENT

## 2023-12-28 PROCEDURE — 90837 PSYTX W PT 60 MINUTES: CPT | Mod: 95

## 2023-12-28 NOTE — PLAN
[Cognitive and/or Behavior Therapy] : Cognitive and/or Behavior Therapy  [Psychodynamic Therapy] : Psychodynamic Therapy  [Psychoeducation] : Psychoeducation  [Skills training (all types)] : Skills training (all types)  [Recommended Frequency of Visits: ____] : Recommended frequency of visits: [unfilled] [Return in ____ week(s)] : Return in [unfilled] week(s) [de-identified] : Mr. Rosenberg arrived on time for today's appointment and was oriented to person, place, and time. Mr. Rosenberg and this provider continued to reflect on and discuss client's concerns from his last appointment with this provider and his reactions to reading his chart notes. Specifically, Mr. Rosenberg shared and discussed his perception of himself as complaining and being weak for difficulties, concerns, and symptoms addressed in session. Mr. Rosenberg and this provider further explored and discussed client's reactions and perceptions of himself and the impact of this provider's word choice on client's reactions. Mr. Rosenberg and this provider identified and discussed normal/common reactions to abnormal/traumatic situations. Mr. Rosenberg and this provider also continued to challenge client's perception of himself as being weak for seeking therapy. Mr. Rosenberg and this provider also reviewed and discussed client's decision to spend time with friends last week and his upcoming plans to spend time outside of the home. Mr. Rosenberg's engagement in pleasant activities was celebrated by this provider. Mr. Rosenberg and this provider also identified and explored client's feelings of guilt while engaged in these activities. Mr. Rosenberg was provided with psychoeducation around guilt, the function of guilt, and guilt as a natural emotion. In addition, Mr. Rosenberg and this provider began to explore and discuss client's trauma history and the ways in which client's reactions to his history and beliefs about his experiences might contribute to his experience of guilt as well his drive for control in various situations.  [FreeTextEntry1] : Continue weekly individual therapy to address symptoms of depression and trauma-related symptoms using a CBT-informed approach.  Problem: depression, low mood, negative beliefs about self, feelings of guilt, worthlessness Treatment: CBT, psychoeducation, behavioral activation  Problem: trauma-related symptoms, avoidance of emotions, negative beliefs about self and others, hyperarousal Treatment: psychoeducation, exposure therapy, CBT/CPT  Problem: anxiety, rumination, worry, difficulties relaxing, irritability Treatment: grounding exercises, CBT, psychoeducation

## 2024-01-04 ENCOUNTER — APPOINTMENT (OUTPATIENT)
Dept: PSYCHIATRY | Facility: CLINIC | Age: 44
End: 2024-01-04
Payer: OTHER GOVERNMENT

## 2024-01-04 PROCEDURE — 90837 PSYTX W PT 60 MINUTES: CPT | Mod: 95

## 2024-01-04 NOTE — PLAN
[Cognitive and/or Behavior Therapy] : Cognitive and/or Behavior Therapy  [Psychoeducation] : Psychoeducation  [Skills training (all types)] : Skills training (all types)  [Supportive Therapy] : Supportive Therapy [de-identified] : Mr. Rosenberg arrived on time for today's appointment and was oriented to person, place, and time. Mr. Rosenberg shared and reflected on his time spent with others since his last appointment and his enjoyment of time outside of the home. Mr. Rosenberg's engagement in social activities was celebrated by this provider. Mr. Rosenberg and this provider further reviewed and discussed client's specific social interactions and his likes/dislikes within these interactions. Mr. Rosenberg also shared and reflected on his concerns related to socializing at work and his perception of feeling more alone at work. Mr. Rosenberg and this provider further reviewed and discussed client's social interactions at work and his displeasure with the minimal interaction between client and his peers. Mr. Rosenberg and this provider worked to better understand client's specific concerns and client identified his want of having a team to interact with. Mr. Rosenberg's drive for teamwork was validated by this provider. Mr. 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 In addition, Mr. Rosenberg and this provider worked to identify ways client might build a sense of teamwork/comradery in his current role. At the end of today's session, Mr. Rosenberg briefly shared and discussed two recent interactions during which client experienced increased anger and the urge/drive to intervene in the interactions of others. Mr. Rosenberg and this provider briefly identified and discussed client's drive to protect others from harm and agreed to follow-up on client's identified experiences during his next session with this provider.  [Recommended Frequency of Visits: ____] : Recommended frequency of visits: [unfilled] [Return in ____ week(s)] : Return in [unfilled] week(s) [FreeTextEntry1] : Continue weekly individual therapy to address symptoms of depression and trauma-related symptoms using a CBT-informed approach.  Problem: depression, low mood, negative beliefs about self, feelings of guilt, worthlessness Treatment: CBT, psychoeducation, behavioral activation  Problem: trauma-related symptoms, avoidance of emotions, negative beliefs about self and others, hyperarousal Treatment: psychoeducation, exposure therapy, CBT/CPT  Problem: anxiety, rumination, worry, difficulties relaxing, irritability Treatment: grounding exercises, CBT, psychoeducation

## 2024-01-04 NOTE — REASON FOR VISIT
[Patient preference] : as per patient preference [Telehealth (audio & video) - Individual/Group] : This visit was provided via telehealth using real-time 2-way audio visual technology. [Other Location: e.g. Home (Enter Location, City,State)___] : The provider was located at [unfilled]. [Home] : The patient, [unfilled], was located at home, [unfilled], at the time of the visit. [Verbal consent obtained from patient/other participant(s)] : Verbal consent for telehealth/telephonic services obtained from patient/other participant(s) [FreeTextEntry4] : 12:00PM [FreeTextEntry5] : 1:10PM [Patient] : Patient

## 2024-01-11 ENCOUNTER — APPOINTMENT (OUTPATIENT)
Dept: PSYCHIATRY | Facility: CLINIC | Age: 44
End: 2024-01-11
Payer: OTHER GOVERNMENT

## 2024-01-11 PROCEDURE — 90837 PSYTX W PT 60 MINUTES: CPT | Mod: 95

## 2024-01-11 NOTE — PLAN
[Cognitive and/or Behavior Therapy] : Cognitive and/or Behavior Therapy  [Psychoeducation] : Psychoeducation  [Skills training (all types)] : Skills training (all types)  [Supportive Therapy] : Supportive Therapy [Recommended Frequency of Visits: ____] : Recommended frequency of visits: [unfilled] [Return in ____ week(s)] : Return in [unfilled] week(s) [de-identified] : Mr. Rosenberg arrived on time for today's appointment and was oriented to person, place, and time. Mr. Rosenberg shared and discussed recent family-related stressors and his increased frustration within the context of these stressors. Mr. Rosenberg and this provider further reviewed and discussed client's specific stressors and client's reactions to these stressors/interactions were validated by this provider. In addition, this provider reflected back to client his pattern of minimizing his own experiences and emotions to reduce conflict in the home. Further, Mr. Rosenberg and this provider identified and discussed the impact of client's minimizing of his own emotions on feelings of frustration and resentment. Mr. Rosenberg's experiences and reactions were validated by this provider. Mr. Rosenberg and this provider also continued to review and discuss client's concerns at work and his perception of himself as being increasingly engaged at work because of his increase in workload. Mr. Rosenberg's increased engagement was celebrated by this provider. In addition, Mr. Rosenberg and this provider continued to explore and discuss client's frustration with the culture of his job and the minimal attention to team building and teamwork. Mr. Rosenberg's interest in comradery and teamwork was validated by this provider and client's frustration with his current work culture was normalized by this provider. Finally, Mr. Rosenberg and this provider reviewed and discussed client's reported drive to intervene to prevent harm to others and worked to better understand client's recent experiences of wanting to intervene. Mr. Rosenberg and this provider identified and reflected on client's history of wanting to protect others and his sense of responsibility to do so. In addition, Mr. Rosenberg and this provider began to explore and discuss the impact of past events/experiences on client's sense of responsibility and drive to protect others.  [FreeTextEntry1] : Continue weekly individual therapy to address symptoms of depression and trauma-related symptoms using a CBT-informed approach.  Problem: depression, low mood, negative beliefs about self, feelings of guilt, worthlessness Treatment: CBT, psychoeducation, behavioral activation  Problem: trauma-related symptoms, avoidance of emotions, negative beliefs about self and others, hyperarousal Treatment: psychoeducation, exposure therapy, CBT/CPT  Problem: anxiety, rumination, worry, difficulties relaxing, irritability Treatment: grounding exercises, CBT, psychoeducation

## 2024-01-11 NOTE — REASON FOR VISIT
[Patient preference] : as per patient preference [Continuing, patient seen in-person within last 12 months] : Telehealth services are continuing as patient has been seen in-person within last 12 months. [Telehealth (audio & video) - Individual/Group] : This visit was provided via telehealth using real-time 2-way audio visual technology. [Medical Office: ([facility])___] : The provider was located at the medical office in [unfilled]. [Home] : The patient, [unfilled], was located at home, [unfilled], at the time of the visit. [Verbal consent obtained from patient/other participant(s)] : Verbal consent for telehealth/telephonic services obtained from patient/other participant(s) [Patient] : Patient [FreeTextEntry4] : 12:00PM [FreeTextEntry5] : 1:10PM

## 2024-01-18 ENCOUNTER — APPOINTMENT (OUTPATIENT)
Dept: PSYCHIATRY | Facility: CLINIC | Age: 44
End: 2024-01-18
Payer: OTHER GOVERNMENT

## 2024-01-18 PROCEDURE — 90837 PSYTX W PT 60 MINUTES: CPT | Mod: 95

## 2024-01-18 NOTE — REASON FOR VISIT
[Patient preference] : as per patient preference [Continuing, patient seen in-person within last 12 months] : Telehealth services are continuing as patient has been seen in-person within last 12 months. [Telehealth (audio & video) - Individual/Group] : This visit was provided via telehealth using real-time 2-way audio visual technology. [Medical Office: ([facility])___] : The provider was located at the medical office in [unfilled]. [Home] : The patient, [unfilled], was located at home, [unfilled], at the time of the visit. [Verbal consent obtained from patient/other participant(s)] : Verbal consent for telehealth/telephonic services obtained from patient/other participant(s) [FreeTextEntry4] : 12:00PM [FreeTextEntry5] : 1:05PM [Patient] : Patient

## 2024-01-18 NOTE — PLAN
[Cognitive and/or Behavior Therapy] : Cognitive and/or Behavior Therapy  [Psychoeducation] : Psychoeducation  [Skills training (all types)] : Skills training (all types)  [Supportive Therapy] : Supportive Therapy [de-identified] : Mr. Rosenberg arrived on time for today's appointment and was oriented to person, place, and time. Mr. Rosenberg reported increased anger and frustration since his last appointment with this provider. In addition, Mr. Rosenberg shared and discussed his experience last night of having an overwhelming urge to cry. Mr. Rosenberg and this provider reviewed and discussed specific examples of times during which client experienced increased anger and frustration. In addition, Mr. Rosenberg and this provider worked to better understand the circumstances surrounding client's urge to cry last night. Mr. Rosenberg and this provider continued to reflect on and discuss client's experience of increased distress at home as well as the impact of the current time of year and reminders of past experiences on his overall mood. In addition, Mr. Rosenberg and this provider identified and discussed client's pattern of expressing various emotions (e.g. sadness, loneliness, hurt) through anger and frustration. Further, Mr. Rosenberg and this provider reflected on and discussed client's focus on ways in which he can change his reaction to his environment to reduce his anger and frustration. Mr. Rosenberg's drive to engage in effective coping was validated by this provider. In addition, Mr. Rosenberg and this provider continued to identify and discuss client's experience of negative emotions and beliefs within the context of different stressors and ways client might work to cope with sadness, loneliness, and hurt rather than anger and frustration. Mr. Rosenberg and this provider continued to identify and discuss client's individual needs at this time and ways client might set aside time for himself and/or for client to connect with his support system.  [Recommended Frequency of Visits: ____] : Recommended frequency of visits: [unfilled] [Return in ____ week(s)] : Return in [unfilled] week(s) [FreeTextEntry1] : Continue weekly individual therapy to address symptoms of depression and trauma-related symptoms using a CBT-informed approach.  Problem: depression, low mood, negative beliefs about self, feelings of guilt, worthlessness Treatment: CBT, psychoeducation, behavioral activation  Problem: trauma-related symptoms, avoidance of emotions, negative beliefs about self and others, hyperarousal Treatment: psychoeducation, exposure therapy, CBT/CPT  Problem: anxiety, rumination, worry, difficulties relaxing, irritability Treatment: grounding exercises, CBT, psychoeducation

## 2024-01-18 NOTE — PHYSICAL EXAM
[Cooperative] : cooperative [Irritable] : irritable [Full] : full [Clear] : clear [Linear/Goal Directed] : linear/goal directed [Average] : average [WNL] : within normal limits

## 2024-01-25 ENCOUNTER — APPOINTMENT (OUTPATIENT)
Dept: PSYCHIATRY | Facility: CLINIC | Age: 44
End: 2024-01-25
Payer: OTHER GOVERNMENT

## 2024-01-25 PROCEDURE — 90837 PSYTX W PT 60 MINUTES: CPT | Mod: 95

## 2024-01-25 NOTE — PLAN
[de-identified] : Mr. Rosenberg arrived on time for today's appointment and was oriented to person, place, and time. Mr. Rosenberg shared and discussed recent psychosocial stressors including within his family of origin, with a friend, and at work. Specifically, Mr. Rosenberg and this provider reflected on and discussed recent events in his sister's life and his concerns regarding the consequences of these events. In addition, Mr. Rosenberg identified and discussed his drive to support his sister and specific ways in which he sees himself as being able to do so. Mr. Rosenberg's drive to support his sister was validated by this provider. Mr. Rosenberg and this provider also identified and discussed the pros and cons of client's plans to support his sister as well as client's limits/boundaries in providing support to his sister. Mr. Rosenberg and this provider also explored and discussed client's drive to support and decision to take on responsibility in helping a friend's spouse. Mr. Rosenberg and this provider continued to identify and reflect on client's pattern of taking on the responsibility of caring for others and his motivations for doing so. In addition, Mr. Rosenberg and this provider explored and discussed client's drive to prevent bad things from happening and the impact of this drive on his behaviors and decisions. Finally, Mr. Roesnberg and this provider reviewed and discussed an upcoming networking call as well as client's ongoing frustrations with his current position at work. Mr. Rosenberg's frustrations were normalized by this provider. In addition, Mr. Rosenberg and this provider reflected on and discussed ways in which client might communicate his strengths to others and worked to challenge client's perception of highlighting his strengths as being pompous.   [FreeTextEntry1] : Continue weekly individual therapy to address symptoms of depression and trauma-related symptoms using a CBT-informed approach.  Problem: depression, low mood, negative beliefs about self, feelings of guilt, worthlessness Treatment: CBT, psychoeducation, behavioral activation  Problem: trauma-related symptoms, avoidance of emotions, negative beliefs about self and others, hyperarousal Treatment: psychoeducation, exposure therapy, CBT/CPT  Problem: anxiety, rumination, worry, difficulties relaxing, irritability Treatment: grounding exercises, CBT, psychoeducation

## 2024-02-01 ENCOUNTER — APPOINTMENT (OUTPATIENT)
Dept: PSYCHIATRY | Facility: CLINIC | Age: 44
End: 2024-02-01
Payer: OTHER GOVERNMENT

## 2024-02-01 PROCEDURE — 90837 PSYTX W PT 60 MINUTES: CPT | Mod: 95

## 2024-02-01 NOTE — REASON FOR VISIT
[Patient preference] : as per patient preference [Continuing, patient not seen in-person within last 12 months (provide details below)] : Telehealth services are continuing, patient not seen in-person within last 12 months.  [Telehealth (audio & video) - Individual/Group] : This visit was provided via telehealth using real-time 2-way audio visual technology. [Medical Office: ([facility])___] : The provider was located at the medical office in [unfilled]. [Home] : The patient, [unfilled], was located at home, [unfilled], at the time of the visit. [Verbal consent obtained from patient/other participant(s)] : Verbal consent for telehealth/telephonic services obtained from patient/other participant(s) [Patient] : Patient [FreeTextEntry4] : 12:00PM [FreeTextEntry5] : 1:10PM

## 2024-02-01 NOTE — PLAN
[Cognitive and/or Behavior Therapy] : Cognitive and/or Behavior Therapy  [Psychoeducation] : Psychoeducation  [Skills training (all types)] : Skills training (all types)  [Supportive Therapy] : Supportive Therapy [Recommended Frequency of Visits: ____] : Recommended frequency of visits: [unfilled] [Return in ____ week(s)] : Return in [unfilled] week(s) [de-identified] : Mr. Rosenberg arrived on time for today's appointment and was oriented to person, place, and time. Mr. Rosenberg shared and discussed an experience from adolescence which client feels has impacted his current approach to different situations and also reinforced beliefs and behaviors developed in early childhood. Mr. Rosenberg's experiences and reactions were validated by this provider. Mr. Rosenberg and this provider continued to review and discuss client's family-related stressors and concerns following recent interactions with his sister and his parents. In addition, Mr. Rosenberg and this provider identified and discussed the impact of these discussions on client's approach to supporting his sister as well as on his expectations of change within the family system. Further, Mr. Rosenberg and this provider explored and discussed the potential limits of change and resolution and identified ways client might cope with having to tolerate these limits. Mr. Rosenberg and this provider also reviewed and discussed client's work-related stress within the context of client's recent annual review and positive feedback. Mr. Rosenberg and this provider further reflected on and discussed client's feedback as well as the implications of this feedback. Mr. Rosenberg's positive feedback was celebrated and validated by this provider. In addition, Mr. Rosenberg and this provider identified and discussed client's approach to potential implications of this feedback. Finally, Mr. Rosenberg briefly expressed his interest in discussing his reactions to the current time of year as they relate to past experiences. Mr. Rosenberg and this provider agreed to follow-up at client's next appointment about client's history of loss during this time of year.  [FreeTextEntry1] : Continue weekly individual therapy to address symptoms of depression and trauma-related symptoms using a CBT-informed approach.  Problem: depression, low mood, negative beliefs about self, feelings of guilt, worthlessness Treatment: CBT, psychoeducation, behavioral activation  Problem: trauma-related symptoms, avoidance of emotions, negative beliefs about self and others, hyperarousal Treatment: psychoeducation, exposure therapy, CBT/CPT  Problem: anxiety, rumination, worry, difficulties relaxing, irritability Treatment: grounding exercises, CBT, psychoeducation

## 2024-02-08 ENCOUNTER — APPOINTMENT (OUTPATIENT)
Dept: PSYCHIATRY | Facility: CLINIC | Age: 44
End: 2024-02-08
Payer: OTHER GOVERNMENT

## 2024-02-08 PROCEDURE — 90837 PSYTX W PT 60 MINUTES: CPT | Mod: 95

## 2024-02-08 NOTE — PLAN
[Cognitive and/or Behavior Therapy] : Cognitive and/or Behavior Therapy  [Psychoeducation] : Psychoeducation  [Skills training (all types)] : Skills training (all types)  [Supportive Therapy] : Supportive Therapy [Recommended Frequency of Visits: ____] : Recommended frequency of visits: [unfilled] [Return in ____ week(s)] : Return in [unfilled] week(s) [de-identified] : Mr. Rosenberg arrived on time for today's appointment and was oriented to person, place, and time. Mr. Rosenberg shared and discussed his recent receipt of his disability rating from the [facility] and his reactions to his rating. Mr. Rosenberg and this provider continued to reflect on and discuss client's frustration and anger towards the claims process as well as the outcome. In addition, Mr. Rosenberg and this provider reviewed and discussed client's decision to file a new claim, his perception of himself as being selfish for doing so, and his frustration with the continuation of the process. Mr. Rosenberg's decision to file a new claim was supported and validated by this provider. Mr. Rosenberg and this provider also worked to challenge client's perception of himself as being selfish. Mr. Rosenberg and this provider briefly reflected on and discussed client's recent birthday and his reactions to others acknowledging his birthday. Mr. Rosenberg and this provider also reviewed and discussed client's decreased frustration tolerance, especially at home. Mr. Rosenberg and this provider continued to reflect on and discuss client's role as a caretaker and support for various family members and the impact of this role on client's overall mood and functioning. In addition, Mr. Rosenberg and this provider identified and discussed client's pattern of placing the needs and well-being of others over his own and the impact of this pattern on his engagement in self-care and coping as well as the impact of this pattern on his drive to problem-solve different stressors. Further, Mr. Rosenberg and this provider identified and discussed the presence of similar patterns in his relationships with friends and acquaintances. Mr. Rosenberg and this provider began to explore and discuss the impact of client's trauma history on his approach to relationships as well as the role of avoidance in client's approach to relationships. This provider reflected back to client his pattern of focusing on acute stressors again during today's appointment rather than his -related stressors.  [FreeTextEntry1] : Continue weekly individual therapy to address symptoms of depression and trauma-related symptoms using a CBT-informed approach.  Problem: depression, low mood, negative beliefs about self, feelings of guilt, worthlessness Treatment: CBT, psychoeducation, behavioral activation  Problem: trauma-related symptoms, avoidance of emotions, negative beliefs about self and others, hyperarousal Treatment: psychoeducation, exposure therapy, CBT/CPT  Problem: anxiety, rumination, worry, difficulties relaxing, irritability Treatment: grounding exercises, CBT, psychoeducation

## 2024-02-08 NOTE — REASON FOR VISIT
[Patient preference] : as per patient preference [Continuing, patient not seen in-person within last 12 months (provide details below)] : Telehealth services are continuing, patient not seen in-person within last 12 months.  [Telehealth (audio & video) - Individual/Group] : This visit was provided via telehealth using real-time 2-way audio visual technology. [Medical Office: ([facility])___] : The provider was located at the medical office in [unfilled]. [Home] : The patient, [unfilled], was located at home, [unfilled], at the time of the visit. [Verbal consent obtained from patient/other participant(s)] : Verbal consent for telehealth/telephonic services obtained from patient/other participant(s) [Patient] : Patient [FreeTextEntry4] : 12:00PM [FreeTextEntry5] : 1:15PM

## 2024-02-15 ENCOUNTER — APPOINTMENT (OUTPATIENT)
Dept: PSYCHIATRY | Facility: CLINIC | Age: 44
End: 2024-02-15
Payer: OTHER GOVERNMENT

## 2024-02-15 PROCEDURE — 90837 PSYTX W PT 60 MINUTES: CPT | Mod: 95

## 2024-02-15 NOTE — REASON FOR VISIT
[Patient preference] : as per patient preference [Continuing, patient not seen in-person within last 12 months (provide details below)] : Telehealth services are continuing, patient not seen in-person within last 12 months.  [Telehealth (audio & video) - Individual/Group] : This visit was provided via telehealth using real-time 2-way audio visual technology. [Medical Office: ([facility])___] : The provider was located at the medical office in [unfilled]. [Home] : The patient, [unfilled], was located at home, [unfilled], at the time of the visit. [Verbal consent obtained from patient/other participant(s)] : Verbal consent for telehealth/telephonic services obtained from patient/other participant(s) [Patient] : Patient [FreeTextEntry4] : 12:01PM [FreeTextEntry5] : 1:30PM

## 2024-02-15 NOTE — PLAN
[Cognitive and/or Behavior Therapy] : Cognitive and/or Behavior Therapy  [Psychoeducation] : Psychoeducation  [Skills training (all types)] : Skills training (all types)  [Supportive Therapy] : Supportive Therapy [Recommended Frequency of Visits: ____] : Recommended frequency of visits: [unfilled] [Return in ____ week(s)] : Return in [unfilled] week(s) [de-identified] : Mr. Rosenberg arrived on time for today's appointment and was oriented to person, place, and time. Mr. Rosenberg and this provider briefly reviewed and discussed client's irritability and frustration following his partner's recent surgery and current recovery. Mr. Rosenberg and this provider focused today's appointment on client's history of -related trauma. Specifically, Mr. Rosenberg shared and discussed various combat-related and training-related traumas that he experienced himself and witnessed with others. In addition, Mr. Rosenberg and this provider identified and reflected on client's experiences of fear, anger, helplessness, sadness, and guilt related to these events. Further, Mr. Rosenberg and this provider identified and discussed client's perception of himself not doing enough to prevent specific events. Mr. Rosenberg's experiences and reactions were validated by this provider. Mr. Rosenberg and this provider also reflected on and discussed client's anger around being asked to identify -related traumas, his frustration/anger with perceiving himself to have disappointed this provider, and his anger while remembering these identified events. Mr. Rosenberg's anger was validated by this provider. In addition, Mr. Rosenberg and this provider identified and discussed the ways in which his anger and irritability were further exacerbated by ongoing psychosocial stressors, both at home and at work. Mr. Rosenberg and this provider continued to reflect on and discuss client's overall well-being and his perception of himself as having no frustration tolerance at this time. In addition, Mr. Rosenberg and this provider continued to identify and discuss client's individual needs at this time and ways in which client might prioritize time for himself despite ongoing responsibilities.  [FreeTextEntry1] : Continue weekly individual therapy to address symptoms of depression and trauma-related symptoms using a CBT-informed approach.  Problem: depression, low mood, negative beliefs about self, feelings of guilt, worthlessness Treatment: CBT, psychoeducation, behavioral activation  Problem: trauma-related symptoms, avoidance of emotions, negative beliefs about self and others, hyperarousal Treatment: psychoeducation, exposure therapy, CBT/CPT  Problem: anxiety, rumination, worry, difficulties relaxing, irritability Treatment: grounding exercises, CBT, psychoeducation

## 2024-02-22 ENCOUNTER — APPOINTMENT (OUTPATIENT)
Dept: PSYCHIATRY | Facility: CLINIC | Age: 44
End: 2024-02-22
Payer: OTHER GOVERNMENT

## 2024-02-22 PROCEDURE — 90837 PSYTX W PT 60 MINUTES: CPT | Mod: 95

## 2024-02-22 NOTE — REASON FOR VISIT
[Patient preference] : as per patient preference [Continuing, patient not seen in-person within last 12 months (provide details below)] : Telehealth services are continuing, patient not seen in-person within last 12 months.  [Telehealth (audio & video) - Individual/Group] : This visit was provided via telehealth using real-time 2-way audio visual technology. [Medical Office: ([facility])___] : The provider was located at the medical office in [unfilled]. [Home] : The patient, [unfilled], was located at home, [unfilled], at the time of the visit. [Verbal consent obtained from patient/other participant(s)] : Verbal consent for telehealth/telephonic services obtained from patient/other participant(s) [Patient] : Patient [FreeTextEntry4] : 12:02PM [FreeTextEntry5] : 1:10PM

## 2024-02-22 NOTE — PHYSICAL EXAM
[Cooperative] : cooperative [Full] : full [Euthymic] : euthymic [Linear/Goal Directed] : linear/goal directed [Clear] : clear [Average] : average [WNL] : within normal limits

## 2024-02-22 NOTE — PLAN
[Psychoeducation] : Psychoeducation  [Cognitive and/or Behavior Therapy] : Cognitive and/or Behavior Therapy  [Supportive Therapy] : Supportive Therapy [Skills training (all types)] : Skills training (all types)  [Recommended Frequency of Visits: ____] : Recommended frequency of visits: [unfilled] [Return in ____ week(s)] : Return in [unfilled] week(s) [de-identified] : Mr. Rosenberg arrived on time for today's appointment and was oriented to person, place, and time. Mr. Rosenberg expressed feeling fatigued and exhausted following a discussion with his parents last night regarding his sister's current difficulties. Mr. Rosenberg and this provider reviewed and discussed client's discussion with his parents and their identified goals for client's sister. In addition, Mr. Rosenberg and this provider reflected on and discussed client's role of being a  between his sister and his parents. Further, Mr. Rosenberg and this provider continued to identify and reflect on client's drive to problem-solve/fix his sister's current difficulties and ways client might cope with his sister's decisions. Mr. Rosenberg and this provider also continued to identify and discuss client's experience of helplessness in different areas of his life and ways in which this feeling motivates client to work to fix different problems and to stay busy with different tasks and activities. Mr. Rosenberg and this provider continued to reflect on and discuss client's need to stay busy and his association between relaxing and laziness. In addition, Mr. Rosenberg and this provider began to identify and discuss the benefits and consequences of client's pattern of staying busy. Finally, Mr. Rosenberg and this provider reflected on and discussed client's pattern of overextending himself as well as his difficulties allowing himself time to rest and/or take a break. Mr. Rosenberg was challenged to allow himself 5 - 10 minutes per day to take a break, especially during work-related tasks.  [FreeTextEntry1] : Continue weekly individual therapy to address symptoms of depression and trauma-related symptoms using a CBT-informed approach.  Problem: depression, low mood, negative beliefs about self, feelings of guilt, worthlessness Treatment: CBT, psychoeducation, behavioral activation  Problem: trauma-related symptoms, avoidance of emotions, negative beliefs about self and others, hyperarousal Treatment: psychoeducation, exposure therapy, CBT/CPT  Problem: anxiety, rumination, worry, difficulties relaxing, irritability Treatment: grounding exercises, CBT, psychoeducation

## 2024-02-22 NOTE — RISK ASSESSMENT
[No, patient denies ideation or behavior] : No, patient denies ideation or behavior [Low acute suicide risk] : Low acute suicide risk [Not clinically indicated] : Safety Plan completed/updated (for individuals at risk): Not clinically indicated [No] : No

## 2024-02-29 ENCOUNTER — APPOINTMENT (OUTPATIENT)
Dept: PSYCHIATRY | Facility: CLINIC | Age: 44
End: 2024-02-29
Payer: OTHER GOVERNMENT

## 2024-02-29 PROCEDURE — 90837 PSYTX W PT 60 MINUTES: CPT | Mod: 95

## 2024-02-29 NOTE — PLAN
[Cognitive and/or Behavior Therapy] : Cognitive and/or Behavior Therapy  [Skills training (all types)] : Skills training (all types)  [Psychoeducation] : Psychoeducation  [de-identified] : Mr. Rosenberg arrived on time for today's appointment and was oriented to person, place, and time. Mr. 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 Mr. Rosenberg and this provider further reflected on and discussed client's reaction to this anniversary, his recent unexpected encounter with someone from his time in service, and his recognition of the changes he has experienced over the last year. In addition, Mr. Rosenberg shared and discussed his drive to remember experiences from his time in service while forgetting the trauma of these experiences. Mr. Rosenberg's experiences and reactions were validated by this provider. In addition, Mr. Rosenberg was provided with psychoeducation around trauma and forgetting versus reducing the emotional impact of specific events. Mr. Roesnberg also expressed concerns around his increased irritability and reduced frustration tolerance, especially when interacting with his children, and shared his perception of himself as needing to be less sensitive, more resilient, and prepared for the unexpected. Mr. Rosenberg and this provider explored and discussed client's specific concerns and recent interactions with his children. In addition, Mr. Rosenberg and this provider continued to identify and discuss client's compounding psychosocial stressors, the impact of the current time of year, and the anniversary of his intermediate, and the impact of these events and stressors on client's overall mood, functioning, and irritability. Mr. Rosenberg and this provider continued to identify and discuss the impact of chronic stress on frustration tolerance. In addition, Mr. Rosenberg and this provider continued to identify and discuss client's difficulties prioritizing himself and his self-care as well as the benefits of client creating more space for himself in his life. Finally, Mr. Rosenberg and this provider briefly reviewed and discussed client's ongoing family-related stressors with his sister and his recent experience of anger towards his mother for her reaction to identified resolutions.  [Supportive Therapy] : Supportive Therapy [Recommended Frequency of Visits: ____] : Recommended frequency of visits: [unfilled] [Return in ____ week(s)] : Return in [unfilled] week(s) [FreeTextEntry1] : Continue weekly individual therapy to address symptoms of depression and trauma-related symptoms using a CBT-informed approach. Next appointment scheduled for 3/15/24 at 12PM due to provider schedule conflict on 3/7/24.   Problem: depression, low mood, negative beliefs about self, feelings of guilt, worthlessness Treatment: CBT, psychoeducation, behavioral activation  Problem: trauma-related symptoms, avoidance of emotions, negative beliefs about self and others, hyperarousal Treatment: psychoeducation, exposure therapy, CBT/CPT  Problem: anxiety, rumination, worry, difficulties relaxing, irritability Treatment: grounding exercises, CBT, psychoeducation

## 2024-02-29 NOTE — REASON FOR VISIT
[Patient preference] : as per patient preference [Continuing, patient not seen in-person within last 12 months (provide details below)] : Telehealth services are continuing, patient not seen in-person within last 12 months.  [Medical Office: ([facility])___] : The provider was located at the medical office in [unfilled]. [Telehealth (audio & video) - Individual/Group] : This visit was provided via telehealth using real-time 2-way audio visual technology. [Home] : The patient, [unfilled], was located at home, [unfilled], at the time of the visit. [Verbal consent obtained from patient/other participant(s)] : Verbal consent for telehealth/telephonic services obtained from patient/other participant(s) [FreeTextEntry4] : 12:00PM [FreeTextEntry5] : 1:10PM [Patient] : Patient

## 2024-03-14 ENCOUNTER — NON-APPOINTMENT (OUTPATIENT)
Age: 44
End: 2024-03-14

## 2024-03-15 ENCOUNTER — APPOINTMENT (OUTPATIENT)
Dept: PSYCHIATRY | Facility: CLINIC | Age: 44
End: 2024-03-15

## 2024-03-20 ENCOUNTER — APPOINTMENT (OUTPATIENT)
Dept: PSYCHIATRY | Facility: CLINIC | Age: 44
End: 2024-03-20
Payer: OTHER GOVERNMENT

## 2024-03-20 PROCEDURE — 90837 PSYTX W PT 60 MINUTES: CPT | Mod: 95

## 2024-03-20 NOTE — REASON FOR VISIT
[Patient preference] : as per patient preference [Continuing, patient not seen in-person within last 12 months (provide details below)] : Telehealth services are continuing, patient not seen in-person within last 12 months.  [Telehealth (audio & video) - Individual/Group] : This visit was provided via telehealth using real-time 2-way audio visual technology. [Medical Office: ([facility])___] : The provider was located at the medical office in [unfilled]. [Home] : The patient, [unfilled], was located at home, [unfilled], at the time of the visit. [FreeTextEntry4] : 12:00PM [Verbal consent obtained from patient/other participant(s)] : Verbal consent for telehealth/telephonic services obtained from patient/other participant(s) [FreeTextEntry5] : 1:00PM [Patient] : Patient

## 2024-03-20 NOTE — PLAN
[Cognitive and/or Behavior Therapy] : Cognitive and/or Behavior Therapy  [Psychoeducation] : Psychoeducation  [Supportive Therapy] : Supportive Therapy [Skills training (all types)] : Skills training (all types)  [de-identified] : Mr. Rosenberg arrived on time for today's appointment and was oriented to person, place, and time. Mr. Rosenberg shared and reflected on recent psychosocial stressors, including attending a networking event, his wife's continued medical difficulties, and other family illnesses. Mr. Rosenberg and this provider reviewed and discussed client's experiences during his identified networking event and his report of increased anxiety during specific parts of this event. Mr. Rosenberg and this provider further explored and discussed client's report of increased anxiety during this event, his discomfort in large crowds, and his efforts to manage his anxiety by taking breaks from the event. Mr. Rosenberg's experiences and reactions were validated by this provider. In addition, Mr. Rosenberg decision to take breaks from the event was supported by this provider. Further, Mr. Rosenberg and this provider explored and discussed client's ongoing family-related stressors and the impact of these stressors on client's experience of burnout, fatigue, and reduced coping. Mr. Rosenberg and this provider also identified and discussed client's concerns around statements made during his networking events about his future and client being unsure of what he wants in a job. Mr. Rosenberg's uncertainty was normalized by this provider. In addition, Mr. Rosenberg and this provider began to identify and discuss client's values and priorities in a career and assess the presence of these identified values and priorities in his current position.  [Recommended Frequency of Visits: ____] : Recommended frequency of visits: [unfilled] [FreeTextEntry1] : Continue weekly individual therapy to address symptoms of depression and trauma-related symptoms using a CBT-informed approach. Next appointment scheduled for 4/4/24 at 12PM due to scheduled provider absence on 3/28/24.  Problem: depression, low mood, negative beliefs about self, feelings of guilt, worthlessness Treatment: CBT, psychoeducation, behavioral activation  Problem: trauma-related symptoms, avoidance of emotions, negative beliefs about self and others, hyperarousal Treatment: psychoeducation, exposure therapy, CBT/CPT  Problem: anxiety, rumination, worry, difficulties relaxing, irritability Treatment: grounding exercises, CBT, psychoeducation    [Return in ____ week(s)] : Return in [unfilled] week(s)

## 2024-03-20 NOTE — END OF VISIT
[Individual Psychotherapy for 53+ minutes] : Individual Psychotherapy for 53+ minutes  [Duration of Psychotherapy Visit (minutes spent in synchronous communication): ____] : Duration of Psychotherapy Visit (minutes spent in synchronous communication): [unfilled] [Teletherapy Service Provided] : The services provided in this session were delivered via tele-therapy

## 2024-04-04 ENCOUNTER — APPOINTMENT (OUTPATIENT)
Dept: PSYCHIATRY | Facility: CLINIC | Age: 44
End: 2024-04-04
Payer: OTHER GOVERNMENT

## 2024-04-04 PROCEDURE — 90837 PSYTX W PT 60 MINUTES: CPT | Mod: 95

## 2024-04-04 NOTE — PLAN
[Cognitive and/or Behavior Therapy] : Cognitive and/or Behavior Therapy  [Psychoeducation] : Psychoeducation  [Skills training (all types)] : Skills training (all types)  [Supportive Therapy] : Supportive Therapy [de-identified] : Mr. Rosenberg arrived on time for today's appointment and was oriented to person, place, and time. Mr. Rosenberg and this provider reviewed and discussed various events and activities since client's last appointment with this provider as well as his reactions to these events. In addition, Mr. Rosenberg shared and discussed his wife's health-related improvements and his hope that these improvements will be maintained over time. Mr. Rosenberg's experiences and reactions were validated by this provider. Mr. Rosenberg shared and discussed his attendance at a recent -related event and his discomfort throughout this event. Mr. Rosenberg identified and discussed his beliefs about himself in comparison to other individuals in attendance. In addition, Mr. Rosenberg and this provider continued to explore and discuss client's general social anxiety and his drive to present himself appropriately in various settings. Mr. Rosenberg and this provider continued to reflect on and discuss client's history of  service and his identities associated with this identity. In addition, Mr. Rosenberg and this provider identified and discussed client's recent pattern of questioning his purpose during his time in service and the impact this questioning on his current discomfort and sense of being without purpose. Further, Mr. Rosenberg and this provider continued to explore and discuss client's drive to disconnect/detach himself from his  service. Mr. Rosenberg and this provider began to further identify client's motivations for detaching from his  service. In addition, Mr. Rosenberg and this provider identified and discussed the pros and cons of client detaching himself from this identity as well as the pros and cons of client working to balance this identity with his other roles and responsibilities.  [Recommended Frequency of Visits: ____] : Recommended frequency of visits: [unfilled] [Return in ____ week(s)] : Return in [unfilled] week(s) [FreeTextEntry1] : Continue weekly individual therapy to address symptoms of depression and trauma-related symptoms using a CBT-informed approach.   Problem: depression, low mood, negative beliefs about self, feelings of guilt, worthlessness Treatment: CBT, psychoeducation, behavioral activation  Problem: trauma-related symptoms, avoidance of emotions, negative beliefs about self and others, hyperarousal Treatment: psychoeducation, exposure therapy, CBT/CPT  Problem: anxiety, rumination, worry, difficulties relaxing, irritability Treatment: grounding exercises, CBT, psychoeducation

## 2024-04-04 NOTE — REASON FOR VISIT
[Patient preference] : as per patient preference [Continuing, patient not seen in-person within last 12 months (provide details below)] : Telehealth services are continuing, patient not seen in-person within last 12 months.  [Telehealth (audio & video) - Individual/Group] : This visit was provided via telehealth using real-time 2-way audio visual technology. [Medical Office: ([facility])___] : The provider was located at the medical office in [unfilled]. [Home] : The patient, [unfilled], was located at home, [unfilled], at the time of the visit. [Verbal consent obtained from patient/other participant(s)] : Verbal consent for telehealth/telephonic services obtained from patient/other participant(s) [FreeTextEntry4] : 12:00PM [FreeTextEntry5] : 1:15PM [Patient] : Patient

## 2024-04-10 ENCOUNTER — NON-APPOINTMENT (OUTPATIENT)
Age: 44
End: 2024-04-10

## 2024-04-11 ENCOUNTER — APPOINTMENT (OUTPATIENT)
Dept: PSYCHIATRY | Facility: CLINIC | Age: 44
End: 2024-04-11

## 2024-04-18 ENCOUNTER — APPOINTMENT (OUTPATIENT)
Dept: PSYCHIATRY | Facility: CLINIC | Age: 44
End: 2024-04-18
Payer: OTHER GOVERNMENT

## 2024-04-18 PROCEDURE — 90837 PSYTX W PT 60 MINUTES: CPT | Mod: 95

## 2024-04-18 NOTE — REASON FOR VISIT
[Patient preference] : as per patient preference [Continuing, patient not seen in-person within last 12 months (provide details below)] : Telehealth services are continuing, patient not seen in-person within last 12 months.  [Telehealth (audio & video) - Individual/Group] : This visit was provided via telehealth using real-time 2-way audio visual technology. [Medical Office: ([facility])___] : The provider was located at the medical office in [unfilled]. [Home] : The patient, [unfilled], was located at home, [unfilled], at the time of the visit. [Verbal consent obtained from patient/other participant(s)] : Verbal consent for telehealth/telephonic services obtained from patient/other participant(s) [Patient] : Patient [FreeTextEntry4] : 12:00PM [FreeTextEntry5] : 1:06PM

## 2024-04-18 NOTE — PLAN
[Cognitive and/or Behavior Therapy] : Cognitive and/or Behavior Therapy  [Psychoeducation] : Psychoeducation  [Skills training (all types)] : Skills training (all types)  [Supportive Therapy] : Supportive Therapy [Recommended Frequency of Visits: ____] : Recommended frequency of visits: [unfilled] [Return in ____ week(s)] : Return in [unfilled] week(s) [de-identified] : Mr. Rosenberg arrived on time for today's appointment and was oriented to person, place, and time. Mr. Rosenberg shared and discussed recent medical events that resulted in client cancelling his last appointment and completing various appointments with different providers. Mr. Rosenberg reviewed and discussed his experience of intense pain for several days as well as his reactions to this pain. Specifically, Mr. Rosenberg shared and discussed his sense of impending doom, his resulting ease and acceptance of his life, and his focus on steps he had previously taken to ensure the well-being of his family. In addition, Mr. Rosenberg shared and discussed his efforts to reach out to a friend for feedback and assistance and his experience of feeling abandoned when this individual did not respond to client. Mr. Rosenberg and this provider also identified and discussed client's emotions of helplessness, sadness, fear, and peace within the context of this medical event. Mr. Rosenberg's reactions were validated by this provider. In addition, Mr. Rosenberg and this provider identified and discussed client's pattern of problem-solving and planning when experiencing feelings of fear and helplessness. Further, Mr. Rosenberg and this provider continued to identify and discuss client's individual needs and ways client might balance his needs with his responsibilities on an upcoming family vacation.  [FreeTextEntry1] : Continue weekly individual therapy to address symptoms of depression and trauma-related symptoms using a CBT-informed approach. Next appointment scheduled for 5/2/24 at 12PM due to client being out of state on 4/25/24.   Problem: depression, low mood, negative beliefs about self, feelings of guilt, worthlessness Treatment: CBT, psychoeducation, behavioral activation  Problem: trauma-related symptoms, avoidance of emotions, negative beliefs about self and others, hyperarousal Treatment: psychoeducation, exposure therapy, CBT/CPT  Problem: anxiety, rumination, worry, difficulties relaxing, irritability Treatment: grounding exercises, CBT, psychoeducation

## 2024-05-02 ENCOUNTER — APPOINTMENT (OUTPATIENT)
Dept: PSYCHIATRY | Facility: CLINIC | Age: 44
End: 2024-05-02
Payer: OTHER GOVERNMENT

## 2024-05-02 PROCEDURE — 90837 PSYTX W PT 60 MINUTES: CPT | Mod: 95

## 2024-05-02 NOTE — REASON FOR VISIT
[Patient preference] : as per patient preference [Continuing, patient not seen in-person within last 12 months (provide details below)] : Telehealth services are continuing, patient not seen in-person within last 12 months.  [Telehealth (audio & video) - Individual/Group] : This visit was provided via telehealth using real-time 2-way audio visual technology. [Medical Office: ([facility])___] : The provider was located at the medical office in [unfilled]. [Home] : The patient, [unfilled], was located at home, [unfilled], at the time of the visit. [Verbal consent obtained from patient/other participant(s)] : Verbal consent for telehealth/telephonic services obtained from patient/other participant(s) [FreeTextEntry4] : 12:00PM [FreeTextEntry5] : 1:10PM [Patient] : Patient

## 2024-05-02 NOTE — PLAN
[Cognitive and/or Behavior Therapy] : Cognitive and/or Behavior Therapy  [Psychoeducation] : Psychoeducation  [Skills training (all types)] : Skills training (all types)  [Supportive Therapy] : Supportive Therapy [de-identified] : Mr. Rosenberg arrived on time for today's appointment and was oriented to person, place, and time. Mr. Rosenberg and this provider briefly reviewed and discussed client's ongoing health concerns and his decision to seek medical treatment for his knee following his recent vacation. Mr. Rosenberg and this provider also briefly reviewed and discussed feedback client has received regarding his experience of headaches and next steps in his care. Mr. Rosenberg focused today's appointment on his recent family trip to [place]. Mr. Rosenberg shared and discussed specific incidents during his vacation during which client experienced increased anxiety, helplessness, and fear. Mr. Rosenberg and this provider further reflected on and discussed client's reactions to being in a crowded restaurant as well as his reactions to fireworks. In addition, Mr. Rosenberg and this provider identified and discussed client's strategy of distracting himself with his children and/or white-knuckling fireworks shows in order to get through these experiences. Mr. Rosenberg's drive to be present for his children was validated by this provider. In addition, Mr. Rosenberg was provided with psychoeducation around trauma and exposure exercises. Further, Mr. Rosenberg and this provider briefly identified and discussed the pros and cons of client's approach to coping with crowded spaces and fireworks. Mr. Rosenberg and this provider also identified and discussed client's anger and frustration with his wife for placing client in a distressing situation. Mr. Rosenberg's frustration was validated by this provider. In addition, Mr. Rosenberg and this provider briefly reviewed, discussed, and practiced ways client might communicate with his wife around these experiences. Finally, Mr. Rosenberg and this provider continued to review and discuss exposure treatment and the benefits of trauma-focused treatment. Mr. Rosenberg expressed his belief that reducing the impact of past events will result in client no longer remembering these events. Mr. Rosenberg's concerns were validated by this provider. In addition, Mr. Rosenberg and this provider reviewed and discussed the differences between trauma responses and memories of traumatic events.  [Recommended Frequency of Visits: ____] : Recommended frequency of visits: [unfilled] [Return in ____ week(s)] : Return in [unfilled] week(s) [FreeTextEntry1] : Continue weekly individual therapy to address symptoms of depression and trauma-related symptoms using a CBT-informed approach.   Problem: depression, low mood, negative beliefs about self, feelings of guilt, worthlessness Treatment: CBT, psychoeducation, behavioral activation  Problem: trauma-related symptoms, avoidance of emotions, negative beliefs about self and others, hyperarousal Treatment: psychoeducation, exposure therapy, CBT/CPT  Problem: anxiety, rumination, worry, difficulties relaxing, irritability Treatment: grounding exercises, CBT, psychoeducation

## 2024-05-09 ENCOUNTER — APPOINTMENT (OUTPATIENT)
Dept: PSYCHIATRY | Facility: CLINIC | Age: 44
End: 2024-05-09
Payer: OTHER GOVERNMENT

## 2024-05-09 PROCEDURE — 90837 PSYTX W PT 60 MINUTES: CPT | Mod: 95

## 2024-05-09 NOTE — REASON FOR VISIT
[Patient preference] : as per patient preference [Continuing, patient not seen in-person within last 12 months (provide details below)] : Telehealth services are continuing, patient not seen in-person within last 12 months.  [Telehealth (audio & video) - Individual/Group] : This visit was provided via telehealth using real-time 2-way audio visual technology. [Medical Office: ([facility])___] : The provider was located at the medical office in [unfilled]. [Home] : The patient, [unfilled], was located at home, [unfilled], at the time of the visit. [Verbal consent obtained from patient/other participant(s)] : Verbal consent for telehealth/telephonic services obtained from patient/other participant(s) [FreeTextEntry4] : 12:00PM [FreeTextEntry5] : 1:05PM [Patient] : Patient

## 2024-05-09 NOTE — PLAN
[Cognitive and/or Behavior Therapy] : Cognitive and/or Behavior Therapy  [Psychoeducation] : Psychoeducation  [Skills training (all types)] : Skills training (all types)  [Supportive Therapy] : Supportive Therapy [de-identified] : Mr. Rosenberg arrived on time for today's appointment and was oriented to person, place, and time. Mr. Rosenberg and this provider initially completed and reviewed the PHQ-9 and CHRIS-7. Mr. Rosenberg and this provider briefly reviewed and discussed client's recent medical appointments and his experience of low mood within the context of expectations for future medical care/procedures. Mr. Rosenberg and this provider also identified and discussed client's drive to resolve his difficulties as soon as possible and the impact of this drive being in conflict with his doctor's treatment plan. Mr. Rosenberg's frustration was validated by this provider. Mr. Rosenberg and this provider continued to reflect on and discuss client's family-related and work-related stressors. Mr. Rosenberg shared and discussed his reactions and concerns following recent interactions with his partner and his sister. In addition, Mr. Rosenberg and this provider identified and reflected on client's perception of himself as complaining and the impact of this belief on his approach to different situations in the workplace. Finally, Mr. Rosenberg and this provider reviewed and discussed client's recent reaction to having time alone and his experience of both relief and loneliness. Mr. Rosenberg and this provider further reflected on client's reactions to time alone as well as his experience of sadness and loneliness while listening to a specific song. Mr. Rosenberg continued to identify and discuss the pros and cons of client having time alone to bring awareness to his own experiences as well as the impact of client's focus on others on his own sense of connection and comfort.  [Recommended Frequency of Visits: ____] : Recommended frequency of visits: [unfilled] [Return in ____ week(s)] : Return in [unfilled] week(s) [FreeTextEntry1] : Continue weekly individual therapy to address symptoms of depression and trauma-related symptoms using a CBT-informed approach.  Problem: depression, low mood, negative beliefs about self, feelings of guilt, worthlessness Treatment: CBT, psychoeducation, behavioral activation  Problem: trauma-related symptoms, avoidance of emotions, negative beliefs about self and others, hyperarousal Treatment: psychoeducation, exposure therapy, CBT/CPT  Problem: anxiety, rumination, worry, difficulties relaxing, irritability Treatment: grounding exercises, CBT, psychoeducation

## 2024-05-16 ENCOUNTER — APPOINTMENT (OUTPATIENT)
Dept: PSYCHIATRY | Facility: CLINIC | Age: 44
End: 2024-05-16

## 2024-05-16 ENCOUNTER — NON-APPOINTMENT (OUTPATIENT)
Age: 44
End: 2024-05-16

## 2024-05-23 ENCOUNTER — APPOINTMENT (OUTPATIENT)
Dept: PSYCHIATRY | Facility: CLINIC | Age: 44
End: 2024-05-23
Payer: OTHER GOVERNMENT

## 2024-05-23 PROCEDURE — 90834 PSYTX W PT 45 MINUTES: CPT | Mod: 95

## 2024-05-23 NOTE — PLAN
[Cognitive and/or Behavior Therapy] : Cognitive and/or Behavior Therapy  [Psychoeducation] : Psychoeducation  [Skills training (all types)] : Skills training (all types)  [Supportive Therapy] : Supportive Therapy [Recommended Frequency of Visits: ____] : Recommended frequency of visits: [unfilled] [Return in ____ week(s)] : Return in [unfilled] week(s) [de-identified] : Mr. Rosenberg arrived 20 minutes late for today's appointment and was oriented to person, place, and time. Mr. Rosenberg shared and discussed recent updates to his father's health, client's concerns about the outcome of his father's health, and client's subsequent feelings of frustration, disappointment, and sadness. Mr. Rosenberg's reactions were normalized and validated by this provider. Mr. Rosenberg and this provider also identified and discussed client's efforts to predict a timeline for the future to reduce the impact of potential loss on client's overall mood and functioning. Mr. Rosenberg's drive to cope ahead was validated by this provider. Mr. Rosenberg and this provider also explored and discussed client's expectations for loss and grief regardless of a timeline. Mr. Rosenberg and this provider continued to reflect on and discuss client's stressors related to his family, his work, and his own health and well-being. Mr. Rosenberg also identified and discussed his difficulties prioritizing various stressors and client and this provider continued to reflect on and discuss the pros and cons of client working to take care of himself in service of other priorities. Finally, Mr. Rosenberg and this provider briefly reviewed and discussed client's current medication-related concerns and conflicting feedback he has received from different medical providers. Mr. Rosenberg's concerns and frustrations were validated by this provider.  [FreeTextEntry1] : Continue weekly individual therapy to address symptoms of depression and trauma-related symptoms using a CBT-informed approach.  Problem: depression, low mood, negative beliefs about self, feelings of guilt, worthlessness Treatment: CBT, psychoeducation, behavioral activation  Problem: trauma-related symptoms, avoidance of emotions, negative beliefs about self and others, hyperarousal Treatment: psychoeducation, exposure therapy, CBT/CPT  Problem: anxiety, rumination, worry, difficulties relaxing, irritability Treatment: grounding exercises, CBT, psychoeducation

## 2024-05-23 NOTE — REASON FOR VISIT
[Patient preference] : as per patient preference [Continuing, patient not seen in-person within last 12 months (provide details below)] : Telehealth services are continuing, patient not seen in-person within last 12 months.  [Telehealth (audio & video) - Individual/Group] : This visit was provided via telehealth using real-time 2-way audio visual technology. [Medical Office: ([facility])___] : The provider was located at the medical office in [unfilled]. [Home] : The patient, [unfilled], was located at home, [unfilled], at the time of the visit. [Verbal consent obtained from patient/other participant(s)] : Verbal consent for telehealth/telephonic services obtained from patient/other participant(s) [Patient] : Patient [FreeTextEntry4] : 12:20PM [FreeTextEntry5] : 1:06PM

## 2024-05-30 ENCOUNTER — APPOINTMENT (OUTPATIENT)
Dept: PSYCHIATRY | Facility: CLINIC | Age: 44
End: 2024-05-30
Payer: OTHER GOVERNMENT

## 2024-05-30 PROCEDURE — 90837 PSYTX W PT 60 MINUTES: CPT | Mod: 95

## 2024-05-30 NOTE — REASON FOR VISIT
[Patient preference] : as per patient preference [Continuing, patient not seen in-person within last 12 months (provide details below)] : Telehealth services are continuing, patient not seen in-person within last 12 months.  [Telehealth (audio & video) - Individual/Group] : This visit was provided via telehealth using real-time 2-way audio visual technology. [Medical Office: ([facility])___] : The provider was located at the medical office in [unfilled]. [Home] : The patient, [unfilled], was located at home, [unfilled], at the time of the visit. [Verbal consent obtained from patient/other participant(s)] : Verbal consent for telehealth/telephonic services obtained from patient/other participant(s) [Patient] : Patient [FreeTextEntry4] : 12:01PM [FreeTextEntry5] : 12:59PM

## 2024-05-30 NOTE — PLAN
[Cognitive and/or Behavior Therapy] : Cognitive and/or Behavior Therapy  [Psychoeducation] : Psychoeducation  [Skills training (all types)] : Skills training (all types)  [Supportive Therapy] : Supportive Therapy [Recommended Frequency of Visits: ____] : Recommended frequency of visits: [unfilled] [Return in ____ week(s)] : Return in [unfilled] week(s) [de-identified] : Mr. Rosenberg arrived on time for today's appointment and was oriented to person, place, and time. Mr. Rosenberg and this provider briefly reviewed and discussed client's understanding of his father's health following recent procedures as well as next steps in his father's care. Mr. Rosenberg and this provider also briefly reviewed and discussed client's own health and next steps in his care. Mr. Rosenberg shared and discussed his identification of himself as feeling lonely, overwhelmed, numb, slow, and distracted. Mr. Rosenberg's awareness of his emotions was celebrated by this provider. Mr. Rosenberg and this provider further explored and discussed client's identified emotions as well as the impact of client's increased number of psychosocial stressors on his emotions. In addition, Mr. Rosenberg and this provider identified and discussed the impact of client's current emotions on his ability to process information and be present at work and at home. Mr. Rosenberg was provided with psychoeducation around the impact of stress on emotions, concentration, and focus. In addition, Mr. Rosenberg and this provider continued to identify and reflect on client's sense of responsibility for others and his difficulties prioritizing time for himself at this time. Mr. Rosenberg's barriers to self-care were validated by this provider. In addition, Mr. Rosenberg and this provider continued to identify and discuss the pros and cons of client practicing self-care and making time for himself. Mr. Rosenberg shared and discussed his feelings of guilt when taking time for himself as well as his fear of being selfish or inadequate in his role as a , father, son, and son-in-law should he take time for himself. Finally, Mr. Rosenberg and this provider continued to identify and discuss ways client might incorporate time for himself into his routine as well as ways client might challenge his beliefs about client being selfish or inadequate for taking time for himself.    [FreeTextEntry1] : Continue weekly individual therapy to address symptoms of depression and trauma-related symptoms using a CBT-informed approach.  Problem: depression, low mood, negative beliefs about self, feelings of guilt, worthlessness Treatment: CBT, psychoeducation, behavioral activation  Problem: trauma-related symptoms, avoidance of emotions, negative beliefs about self and others, hyperarousal Treatment: psychoeducation, exposure therapy, CBT/CPT  Problem: anxiety, rumination, worry, difficulties relaxing, irritability Treatment: grounding exercises, CBT, psychoeducation

## 2024-06-05 ENCOUNTER — NON-APPOINTMENT (OUTPATIENT)
Age: 44
End: 2024-06-05

## 2024-06-06 ENCOUNTER — APPOINTMENT (OUTPATIENT)
Dept: PSYCHIATRY | Facility: CLINIC | Age: 44
End: 2024-06-06

## 2024-06-13 ENCOUNTER — APPOINTMENT (OUTPATIENT)
Dept: PSYCHIATRY | Facility: CLINIC | Age: 44
End: 2024-06-13
Payer: OTHER GOVERNMENT

## 2024-06-13 PROCEDURE — 90837 PSYTX W PT 60 MINUTES: CPT | Mod: 95

## 2024-06-13 NOTE — PLAN
[Cognitive and/or Behavior Therapy] : Cognitive and/or Behavior Therapy  [Psychoeducation] : Psychoeducation  [Skills training (all types)] : Skills training (all types)  [Supportive Therapy] : Supportive Therapy [de-identified] : Mr. Rosenberg arrived on time for today's appointment and was oriented to person, place, and time. Mr. Rosenberg briefly shared and discussed his concerns around his appointments with this provider and client's perception of himself as using appointments as a place to complain about his current circumstances. Mr. Rosenberg and this provider further reflected on and discussed client's perception of himself as complaining, his expectations of therapy, his alternative supports and outlets for current psychosocial stressors, and his goals for treatment. In addition, Mr. Rosenberg and this provider worked to challenge client's perception of himself as complaining. Mr. Rosenberg and this provider continued to identify and discuss client's ongoing psychosocial stressors as well as his difficulties coping with these stressors. Mr. Rosenberg and this provider focused on specific concerns related to the well-being of his father and his wife. Mr. Rosenberg's concerns were validated by this provider. In addition, Mr. Rosenberg and this provider continued to identify and discuss client's drive to resolve the difficulties these individuals are currently experiencing, his limited control over the outcomes of these difficulties, and the impact of this limited control on client's beliefs about himself as a , father, and son, as well as his perception of himself as being useful and helpful to others. Mr. Rosenberg and this provider also continued to identify and reflect on client's expectations for himself within his various roles and began to identify the limits of client's control in these roles. Further, Mr. Rosenberg and this provider continued to explore and discuss client's drive to protect others from harm and negative emotions and the limits of client's management of these outcomes.  [Recommended Frequency of Visits: ____] : Recommended frequency of visits: [unfilled] [Return in ____ week(s)] : Return in [unfilled] week(s) [FreeTextEntry1] : Continue weekly individual therapy to address symptoms of depression and trauma-related symptoms using a CBT-informed approach.  Problem: depression, low mood, negative beliefs about self, feelings of guilt, worthlessness Treatment: CBT, psychoeducation, behavioral activation  Problem: trauma-related symptoms, avoidance of emotions, negative beliefs about self and others, hyperarousal Treatment: psychoeducation, exposure therapy, CBT/CPT  Problem: anxiety, rumination, worry, difficulties relaxing, irritability Treatment: grounding exercises, CBT, psychoeducation

## 2024-06-13 NOTE — REASON FOR VISIT
[Patient preference] : as per patient preference [Continuing, patient not seen in-person within last 12 months (provide details below)] : Telehealth services are continuing, patient not seen in-person within last 12 months.  [Telehealth (audio & video) - Individual/Group] : This visit was provided via telehealth using real-time 2-way audio visual technology. [Medical Office: ([facility])___] : The provider was located at the medical office in [unfilled]. [Home] : The patient, [unfilled], was located at home, [unfilled], at the time of the visit. [Verbal consent obtained from patient/other participant(s)] : Verbal consent for telehealth/telephonic services obtained from patient/other participant(s) [FreeTextEntry4] : 12:00PM [FreeTextEntry5] : 1:06PM [Patient] : Patient

## 2024-06-19 ENCOUNTER — NON-APPOINTMENT (OUTPATIENT)
Age: 44
End: 2024-06-19

## 2024-06-20 ENCOUNTER — APPOINTMENT (OUTPATIENT)
Dept: PSYCHIATRY | Facility: CLINIC | Age: 44
End: 2024-06-20

## 2024-07-02 ENCOUNTER — APPOINTMENT (OUTPATIENT)
Dept: PSYCHIATRY | Facility: CLINIC | Age: 44
End: 2024-07-02
Payer: OTHER GOVERNMENT

## 2024-07-02 PROBLEM — F32.9 MAJOR DEPRESSIVE DISORDER: Status: ACTIVE | Noted: 2023-02-09

## 2024-07-02 PROBLEM — F43.0 STRESS REACTION: Status: ACTIVE | Noted: 2023-02-22

## 2024-07-02 PROCEDURE — 90837 PSYTX W PT 60 MINUTES: CPT | Mod: 95

## 2024-07-11 ENCOUNTER — APPOINTMENT (OUTPATIENT)
Dept: PSYCHIATRY | Facility: CLINIC | Age: 44
End: 2024-07-11
Payer: OTHER GOVERNMENT

## 2024-07-11 DIAGNOSIS — F43.0 ACUTE STRESS REACTION: ICD-10-CM

## 2024-07-11 DIAGNOSIS — F32.9 MAJOR DEPRESSIVE DISORDER, SINGLE EPISODE, UNSPECIFIED: ICD-10-CM

## 2024-07-11 PROCEDURE — 90837 PSYTX W PT 60 MINUTES: CPT | Mod: 95

## 2024-07-18 ENCOUNTER — APPOINTMENT (OUTPATIENT)
Dept: PSYCHIATRY | Facility: CLINIC | Age: 44
End: 2024-07-18

## 2024-07-24 ENCOUNTER — NON-APPOINTMENT (OUTPATIENT)
Age: 44
End: 2024-07-24

## 2024-07-24 DIAGNOSIS — F32.9 MAJOR DEPRESSIVE DISORDER, SINGLE EPISODE, UNSPECIFIED: ICD-10-CM

## 2024-07-24 NOTE — PHYSICAL EXAM
[Cooperative] : cooperative [Anxious] : anxious [Full] : full [Clear] : clear [Linear/Goal Directed] : linear/goal directed [Average] : average [WNL] : within normal limits [Depressed] : depressed

## 2024-07-24 NOTE — END OF VISIT
[Individual Psychotherapy for 53+ minutes] : Individual Psychotherapy for 53+ minutes  [Teletherapy Service Provided] : The services provided in this session were delivered via tele-therapy [Duration of Psychotherapy Visit (minutes spent in synchronous communication): ____] : Duration of Psychotherapy Visit (minutes spent in synchronous communication): [unfilled]

## 2024-07-25 ENCOUNTER — APPOINTMENT (OUTPATIENT)
Dept: PSYCHIATRY | Facility: CLINIC | Age: 44
End: 2024-07-25

## 2024-07-31 NOTE — PLAN
[Cognitive and/or Behavior Therapy] : Cognitive and/or Behavior Therapy  [Psychoeducation] : Psychoeducation  [Supportive Therapy] : Supportive Therapy [Recommended Frequency of Visits: ____] : Recommended frequency of visits: [unfilled] [Return in ____ week(s)] : Return in [unfilled] week(s) [FreeTextEntry2] : Problem: depression, low mood, negative beliefs about self, feelings of guilt, worthlessness Treatment: CBT (e.g., psychoeducation, behavioral activation) Goal: improvement in PHQ-9 by 20% from baseline  Problem: trauma-related symptoms, avoidance of emotions, negative beliefs about self and others, hyperarousal Treatment: CBT (e.g., psychoeducation, exposure therapy), CPT (e.g., cognitive restructuring) Goal: improvement in PCL-5 by 20% from baseline  Problem: anxiety, rumination, worry, difficulties relaxing, irritability Treatment: CBT (e.g., grounding exercises, psychoeducation) Goal: improvement in CHRIS-7 by 20% from baseline [de-identified] : Mr. Rosenberg arrived on time for today's appointment and was oriented to person, place, and time. The session began at 12:03pm and ended at 12:58pm. Focus of the session was on identifying current challenges and goals for treatment, discussing the unexpected transition to the new provider, and exploring options for self-care. Mr. Rosenberg identified that taking time for himself to process his emotions would be helpful. Pt was receptive to Writer's interventions. No acute risk noted. Plan is to meet next on 7/31.   [FreeTextEntry1] : Continue weekly individual therapy to address symptoms of depression and trauma-related symptoms using a CBT-informed approach.

## 2024-07-31 NOTE — REASON FOR VISIT
[Patient preference] : as per patient preference [Continuing, patient not seen in-person within last 12 months (provide details below)] : Telehealth services are continuing, patient not seen in-person within last 12 months.  [Telehealth (audio & video) - Individual/Group] : This visit was provided via telehealth using real-time 2-way audio visual technology. [Other Location: e.g. Home (Enter Location, City,State)___] : The provider was located at [unfilled]. [Home] : The patient, [unfilled], was located at home, [unfilled], at the time of the visit. [Verbal consent obtained from patient/other participant(s)] : Verbal consent for telehealth/telephonic services obtained from patient/other participant(s) [Patient] : Patient [FreeTextEntry4] : 12:03PM [FreeTextEntry5] : 12:58PM [FreeTextEntry1] : depression, trauma-related symptoms, anxiety

## 2024-07-31 NOTE — REASON FOR VISIT
[Patient preference] : as per patient preference [Continuing, patient not seen in-person within last 12 months (provide details below)] : Telehealth services are continuing, patient not seen in-person within last 12 months.  [Telehealth (audio & video) - Individual/Group] : This visit was provided via telehealth using real-time 2-way audio visual technology. [Other Location: e.g. Home (Enter Location, City,State)___] : The provider was located at [unfilled]. [Home] : The patient, [unfilled], was located at home, [unfilled], at the time of the visit. [Verbal consent obtained from patient/other participant(s)] : Verbal consent for telehealth/telephonic services obtained from patient/other participant(s) [Patient] : Patient [FreeTextEntry4] : 12:03PM [FreeTextEntry5] : 12:58PM [FreeTextEntry1] : depression, trauma-related symptoms, anxiety 8

## 2024-07-31 NOTE — PLAN
[Cognitive and/or Behavior Therapy] : Cognitive and/or Behavior Therapy  [Psychoeducation] : Psychoeducation  [Supportive Therapy] : Supportive Therapy [Recommended Frequency of Visits: ____] : Recommended frequency of visits: [unfilled] [Return in ____ week(s)] : Return in [unfilled] week(s) [FreeTextEntry2] : Problem: depression, low mood, negative beliefs about self, feelings of guilt, worthlessness Treatment: CBT (e.g., psychoeducation, behavioral activation) Goal: improvement in PHQ-9 by 20% from baseline  Problem: trauma-related symptoms, avoidance of emotions, negative beliefs about self and others, hyperarousal Treatment: CBT (e.g., psychoeducation, exposure therapy), CPT (e.g., cognitive restructuring) Goal: improvement in PCL-5 by 20% from baseline  Problem: anxiety, rumination, worry, difficulties relaxing, irritability Treatment: CBT (e.g., grounding exercises, psychoeducation) Goal: improvement in CHRIS-7 by 20% from baseline [de-identified] : Mr. Rosenberg arrived on time for today's appointment and was oriented to person, place, and time. The session began at 12:03pm and ended at 12:58pm. Focus of the session was on identifying current challenges and goals for treatment, discussing the unexpected transition to the new provider, and exploring options for self-care. Mr. Rosenberg identified that taking time for himself to process his emotions would be helpful. Pt was receptive to Writer's interventions. No acute risk noted. Plan is to meet next on 7/31.   [FreeTextEntry1] : Continue weekly individual therapy to address symptoms of depression and trauma-related symptoms using a CBT-informed approach.

## 2024-08-01 ENCOUNTER — NON-APPOINTMENT (OUTPATIENT)
Age: 44
End: 2024-08-01

## 2024-08-07 ENCOUNTER — NON-APPOINTMENT (OUTPATIENT)
Age: 44
End: 2024-08-07

## 2024-08-13 ENCOUNTER — NON-APPOINTMENT (OUTPATIENT)
Age: 44
End: 2024-08-13

## 2024-08-23 ENCOUNTER — NON-APPOINTMENT (OUTPATIENT)
Age: 44
End: 2024-08-23

## 2024-09-18 ENCOUNTER — NON-APPOINTMENT (OUTPATIENT)
Age: 44
End: 2024-09-18

## 2024-09-18 DIAGNOSIS — F32.9 MAJOR DEPRESSIVE DISORDER, SINGLE EPISODE, UNSPECIFIED: ICD-10-CM

## 2024-09-20 NOTE — REASON FOR VISIT
[Other:___] : [unfilled] [FreeTextEntry3] : [email or web address] [FreeTextEntry1] : Dr. Raya could not continue with treatment and Mr. Rosenberg was assigned to a student therapist whom he met with for one session. He did not attend any additional sessions and was offered the option to continue seeing the student therapist, work with a fulltime staff member, or be provided a referral.

## 2024-09-20 NOTE — HISTORY OF PRESENT ILLNESS
[Not Applicable] : Not applicable [FreeTextEntry1] : From intake on 2/08/2023 "Mr. Rosenberg is a 43-year-old, heterosexual, , Somali-American, male, [employer]  presenting to treatment with symptoms of depression within the context of client's transition out of the [employer] and history of traumatic experiences. Specifically, Mr. Rosenberg endorsed low mood, loss of interest, feelings of hopelessness, and helplessness, tearfulness, overeating, and feeling as if he is a failure. In addition, Mr. Rosenberg reported experiencing difficulties falling and staying asleep, hypervigilance, irritability, difficulties experiencing positive emotions, and avoidance. Mr. 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 Mr. Rosenberg expressed concerns about his transition out of the [employer] and his ability to cope with having more free time and fewer distractions. Per client, prior to November 2022 he worked to avoid his emotions and difficulties and distracted himself with work and training. Mr. Rosenberg denied current suicidal/homicidal ideation, plan, and intent at intake. In addition, Mr. Rosenberg denied any current self-harm behaviors. Further, Mr. Rosenberg denied current auditory/visual hallucinations or delusions. Mr. Rosenberg denied current manic symptoms."  Mr. Rosenberg reported during the one session with student therapist on 7/24/24 current difficulties with coping with his father's cancer diagnosis and feeling overwhelmed and concerned about how much longer he can handle feeling over capacity. He reported symptoms of depression, hypervigilance, emotional avoidance, and feeling burned out juggling work, being a present father, and supporting his family with his father's diagnosis. Pt denied current SHIIP/bx or NSSI thoughts/bx. He was in good behavioral control in session.   [FreeTextEntry2] : From intake on 2/08/2023 "Mr. Rosenberg reported that he met with a provider at the [facility] for two sessions approximately 7 years ago after client underwent surgery on his wrist and was struggling with the aftermath of the surgery. Mr. Rosenberg denied any other history of mental health treatment. Per client, he has worked to remain strong and resilient throughout his time in service in order to focus on his work and set an example for his peers and subordinates. In addition, Mr. Rosenberg noted that he often worried about the impact of seeking mental health treatment on his role in the [employer]. Mr. Rosenberg reported a history of passive suicidal ideation ("why do I have to be this way?" "what is the point?") "on and off" over the last several years. Per client, he last experienced passive suicidal ideation in early December 2022 following a death by suicide on his base. However, Mr. Rosenberg denied any history of plan or intent. In addition, Mr. Rosenberg denied any history of self-harm or suicide attempts. Further, Mr. Rosenberg denied any history of psychiatric hospitalizations. Mr. Rosenberg denied any history of psychosis or manic symptoms."  Mr. Rosenberg worked with Dr. Raya from 2/15/23 to 7/11/24.  [FreeTextEntry3] : Per intake on 2/08/2023 "Mr. Rosenberg indicated that his primary care provider prescribed client Zoloft in late January 2023 and that client has not filled the prescription or taken the medication. Mr. Rosenberg denied any history other history of being prescription psychiatric medications".

## 2024-09-20 NOTE — HISTORY OF PRESENT ILLNESS
[Not Applicable] : Not applicable [FreeTextEntry1] : From intake on 2/08/2023 "Mr. Rosenberg is a 43-year-old, heterosexual, , [place]-American, male, [employer]  presenting to treatment with symptoms of depression within the context of client's transition out of the [employer] and history of traumatic experiences. Specifically, Mr. Rosenberg endorsed low mood, loss of interest, feelings of hopelessness, and helplessness, tearfulness, overeating, and feeling as if he is a failure. In addition, Mr. Rosenberg reported experiencing difficulties falling and staying asleep, hypervigilance, irritability, difficulties experiencing positive emotions, and avoidance. Mr. 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 Mr. Rosenberg expressed concerns about his transition out of the [employer] and his ability to cope with having more free time and fewer distractions. Per client, prior to November 2022 he worked to avoid his emotions and difficulties and distracted himself with work and training. Mr. Rosenberg denied current suicidal/homicidal ideation, plan, and intent at intake. In addition, Mr. Rosenberg denied any current self-harm behaviors. Further, Mr. Rosenberg denied current auditory/visual hallucinations or delusions. Mr. Rosenberg denied current manic symptoms."  Mr. Rosenberg reported during the one session with student therapist on 7/24/24 current difficulties with coping with his father's cancer diagnosis and feeling overwhelmed and concerned about how much longer he can handle feeling over capacity. He reported symptoms of depression, hypervigilance, emotional avoidance, and feeling burned out juggling work, being a present father, and supporting his family with his father's diagnosis. Pt denied current SHIIP/bx or NSSI thoughts/bx. He was in good behavioral control in session.   [FreeTextEntry2] : From intake on 2/08/2023 "Mr. Rosenberg reported that he met with a provider at the [facility] for two sessions approximately 7 years ago after client underwent surgery on his wrist and was struggling with the aftermath of the surgery. Mr. Rosenberg denied any other history of mental health treatment. Per client, he has worked to remain strong and resilient throughout his time in service in order to focus on his work and set an example for his peers and subordinates. In addition, Mr. Rosenberg noted that he often worried about the impact of seeking mental health treatment on his role in the [employer]. Mr. Rosenberg reported a history of passive suicidal ideation ("why do I have to be this way?" "what is the point?") "on and off" over the last several years. Per client, he last experienced passive suicidal ideation in early December 2022 following a death by suicide on his base. However, Mr. Rosenberg denied any history of plan or intent. In addition, Mr. Rosenberg denied any history of self-harm or suicide attempts. Further, Mr. Rosenberg denied any history of psychiatric hospitalizations. Mr. Rosenberg denied any history of psychosis or manic symptoms."  Mr. Rosenberg worked with Dr. Raya from 2/15/23 to 7/11/24.  [FreeTextEntry3] : Per intake on 2/08/2023 "Mr. Rosenberg indicated that his primary care provider prescribed client Zoloft in late January 2023 and that client has not filled the prescription or taken the medication. Mr. Rosenberg denied any history other history of being prescription psychiatric medications".

## 2024-09-20 NOTE — PLAN
[de-identified] : Dr. Raya could not continue with treatment and Mr. Rosenberg was assigned to a student therapist whom he met with for one session. He did not attend any additional sessions and was offered the option to continue seeing the student therapist, work with a fulltime staff member, or be provided a referral.

## 2024-09-20 NOTE — PAST MEDICAL HISTORY
[FreeTextEntry1] : Per intake on 2/08/2023 "Mr. Rosenberg reported a history of traumatic brain injury following a training accident. In addition, Mr. Rosenberg reported a history of several surgeries including to repair client's knees, shoulder, and wrist."

## 2024-09-20 NOTE — PLAN
[de-identified] : Dr. Raya could not continue with treatment and Mr. Rosenberg was assigned to a student therapist whom he met with for one session. He did not attend any additional sessions and was offered the option to continue seeing the student therapist, work with a fulltime staff member, or be provided a referral.

## 2024-09-20 NOTE — DISCUSSION/SUMMARY
[FreeTextEntry1] : It is recommended that Mr. Rosenberg continue individual psychotherapy with a new provider to continue working on his goals to increase distress tolerance and coping capacity and be assessed and treated for trauma-related symptoms.

## 2024-09-20 NOTE — TREATMENT
[de-identified] : One session of CBT-informed supportive therapy was provided on 7/24/2024.   [FreeTextEntry8] : No known psychiatric medications [de-identified] : One session of CBT-informed supportive therapy was provided processing the abrupt transition in treatment provider.  [de-identified] : father's cancer diagnosis, abrupt end of care with previous therapist [de-identified] : Mr. Rosenberg reported decrease in emotional avoidance in work with Dr. Raya. Due to meeting for one session, unable to assess progress with current therapist. [de-identified] : Mr. Rosenberg is a 43-year-old, heterosexual, , [place]-American, male, [employer]  presenting to treatment with symptoms of depression within the context of client's father's cancer diagnosis and trauma-related symptoms from his time in the [employer]. He reported symptoms of depression, hypervigilance, emotional avoidance, and feeling burned out juggling work, being a present father, and supporting his family with his father's diagnosis. Pt denied current SHIIP/bx or NSSI thoughts/bx. He was in good behavioral control in session.  Dr. Raya could not continue with treatment and Mr. Rosenberg was assigned to a student therapist whom he met with for one session. He did not attend any additional sessions and was offered the option to continue seeing the student therapist, work with a fulltime staff member, or be provided a referral.

## 2024-09-20 NOTE — TREATMENT
[de-identified] : One session of CBT-informed supportive therapy was provided on 7/24/2024.   [FreeTextEntry8] : No known psychiatric medications [de-identified] : One session of CBT-informed supportive therapy was provided processing the abrupt transition in treatment provider.  [de-identified] : father's cancer diagnosis, abrupt end of care with previous therapist [de-identified] : Mr. Rosenberg reported decrease in emotional avoidance in work with Dr. Raya. Due to meeting for one session, unable to assess progress with current therapist. [de-identified] : Mr. Rosenberg is a 43-year-old, heterosexual, , [place]-American, male, [employer]  presenting to treatment with symptoms of depression within the context of client's father's cancer diagnosis and trauma-related symptoms from his time in the [employer]. He reported symptoms of depression, hypervigilance, emotional avoidance, and feeling burned out juggling work, being a present father, and supporting his family with his father's diagnosis. Pt denied current SHIIP/bx or NSSI thoughts/bx. He was in good behavioral control in session.  Dr. Raya could not continue with treatment and Mr. Rosenberg was assigned to a student therapist whom he met with for one session. He did not attend any additional sessions and was offered the option to continue seeing the student therapist, work with a fulltime staff member, or be provided a referral.

## 2024-09-20 NOTE — REASON FOR VISIT
[Other:___] : [unfilled] [FreeTextEntry3] : [email or web address] [FreeTextEntry1] : Dr. Raya could not continue with treatment and Mr. Rosenberg was assigned to a student therapist whom he met with for one session. He did not attend any additional sessions and was offered the option to continue seeing the student therapist, work with a fulltime staff member, or be provided a referral.

## 2025-05-22 ENCOUNTER — APPOINTMENT (OUTPATIENT)
Dept: ORTHOPEDIC SURGERY | Facility: CLINIC | Age: 45
End: 2025-05-22
Payer: OTHER GOVERNMENT

## 2025-05-22 DIAGNOSIS — M25.562 PAIN IN RIGHT KNEE: ICD-10-CM

## 2025-05-22 DIAGNOSIS — S83.272A COMPLEX TEAR OF LATERAL MENISCUS, CURRENT INJURY, LEFT KNEE, INITIAL ENCOUNTER: ICD-10-CM

## 2025-05-22 DIAGNOSIS — G89.29 PAIN IN RIGHT KNEE: ICD-10-CM

## 2025-05-22 DIAGNOSIS — M23.91 UNSPECIFIED INTERNAL DERANGEMENT OF RIGHT KNEE: ICD-10-CM

## 2025-05-22 DIAGNOSIS — M23.92 UNSPECIFIED INTERNAL DERANGEMENT OF LEFT KNEE: ICD-10-CM

## 2025-05-22 DIAGNOSIS — M25.561 PAIN IN RIGHT KNEE: ICD-10-CM

## 2025-05-22 DIAGNOSIS — S83.271A COMPLEX TEAR OF LATERAL MENISCUS, CURRENT INJURY, RIGHT KNEE, INITIAL ENCOUNTER: ICD-10-CM

## 2025-05-22 PROCEDURE — 73564 X-RAY EXAM KNEE 4 OR MORE: CPT | Mod: 50

## 2025-05-22 PROCEDURE — 99204 OFFICE O/P NEW MOD 45 MIN: CPT

## 2025-05-22 RX ORDER — METHYLPREDNISOLONE 4 MG/1
4 TABLET ORAL
Qty: 1 | Refills: 0 | Status: ACTIVE | COMMUNITY
Start: 2025-05-22 | End: 1900-01-01

## 2025-07-08 ENCOUNTER — APPOINTMENT (OUTPATIENT)
Dept: ORTHOPEDIC SURGERY | Facility: CLINIC | Age: 45
End: 2025-07-08

## 2025-07-08 PROBLEM — M17.11 PRIMARY OSTEOARTHRITIS OF RIGHT KNEE: Status: ACTIVE | Noted: 2025-07-08

## 2025-07-08 PROCEDURE — 20610 DRAIN/INJ JOINT/BURSA W/O US: CPT | Mod: RT

## 2025-07-08 PROCEDURE — 99213 OFFICE O/P EST LOW 20 MIN: CPT | Mod: 25

## 2025-09-18 ENCOUNTER — APPOINTMENT (OUTPATIENT)
Dept: ORTHOPEDIC SURGERY | Facility: AMBULATORY SURGERY CENTER | Age: 45
End: 2025-09-18

## 2025-09-18 RX ORDER — HYDROCODONE BITARTRATE AND ACETAMINOPHEN 5; 325 MG/1; MG/1
5-325 TABLET ORAL
Qty: 10 | Refills: 0 | Status: ACTIVE | COMMUNITY
Start: 2025-09-18 | End: 1900-01-01